# Patient Record
Sex: MALE | Race: WHITE | NOT HISPANIC OR LATINO | Employment: OTHER | URBAN - METROPOLITAN AREA
[De-identification: names, ages, dates, MRNs, and addresses within clinical notes are randomized per-mention and may not be internally consistent; named-entity substitution may affect disease eponyms.]

---

## 2017-09-14 ENCOUNTER — COMMUNICATION - HEALTHEAST (OUTPATIENT)
Dept: CARDIOLOGY | Facility: CLINIC | Age: 77
End: 2017-09-14

## 2017-09-14 DIAGNOSIS — I10 HTN (HYPERTENSION): ICD-10-CM

## 2017-11-08 ENCOUNTER — AMBULATORY - HEALTHEAST (OUTPATIENT)
Dept: CARDIOLOGY | Facility: CLINIC | Age: 77
End: 2017-11-08

## 2017-11-08 ENCOUNTER — COMMUNICATION - HEALTHEAST (OUTPATIENT)
Dept: CARDIOLOGY | Facility: CLINIC | Age: 77
End: 2017-11-08

## 2017-11-08 DIAGNOSIS — I25.810 CORONARY ARTERY DISEASE INVOLVING CORONARY BYPASS GRAFT: ICD-10-CM

## 2017-11-08 DIAGNOSIS — I25.10 CAD (CORONARY ARTERY DISEASE): ICD-10-CM

## 2017-12-10 ENCOUNTER — COMMUNICATION - HEALTHEAST (OUTPATIENT)
Dept: CARDIOLOGY | Facility: CLINIC | Age: 77
End: 2017-12-10

## 2017-12-10 DIAGNOSIS — I10 HTN (HYPERTENSION): ICD-10-CM

## 2018-01-08 ENCOUNTER — HOSPITAL ENCOUNTER (OUTPATIENT)
Dept: NUCLEAR MEDICINE | Facility: HOSPITAL | Age: 78
Discharge: HOME | End: 2018-01-08
Attending: INTERNAL MEDICINE

## 2018-01-08 ENCOUNTER — HOSPITAL ENCOUNTER (OUTPATIENT)
Dept: CARDIOLOGY | Facility: HOSPITAL | Age: 78
Discharge: HOME | End: 2018-01-08
Attending: INTERNAL MEDICINE

## 2018-01-08 DIAGNOSIS — I25.810 CORONARY ARTERY DISEASE INVOLVING CORONARY BYPASS GRAFT: ICD-10-CM

## 2018-01-08 DIAGNOSIS — I25.10 CAD (CORONARY ARTERY DISEASE): ICD-10-CM

## 2018-01-08 LAB
CV STRESS CURRENT BP HE: NORMAL
CV STRESS CURRENT HR HE: 77
CV STRESS CURRENT HR HE: 78
CV STRESS CURRENT HR HE: 79
CV STRESS CURRENT HR HE: 81
CV STRESS CURRENT HR HE: 82
CV STRESS CURRENT HR HE: 83
CV STRESS CURRENT HR HE: 83
CV STRESS CURRENT HR HE: 84
CV STRESS CURRENT HR HE: 85
CV STRESS CURRENT HR HE: 88
CV STRESS CURRENT HR HE: 88
CV STRESS CURRENT HR HE: 90
CV STRESS DEVIATION TIME HE: NORMAL
CV STRESS ECHO PERCENT HR HE: NORMAL
CV STRESS EXERCISE STAGE HE: NORMAL
CV STRESS FINAL RESTING BP HE: NORMAL
CV STRESS FINAL RESTING HR HE: 83
CV STRESS MAX HR HE: 91
CV STRESS MAX TREADMILL GRADE HE: 0
CV STRESS MAX TREADMILL SPEED HE: 0
CV STRESS PEAK DIA BP HE: NORMAL
CV STRESS PEAK SYS BP HE: NORMAL
CV STRESS PHASE HE: NORMAL
CV STRESS PROTOCOL HE: NORMAL
CV STRESS RESTING PT POSITION HE: NORMAL
CV STRESS ST DEVIATION AMOUNT HE: NORMAL
CV STRESS ST DEVIATION ELEVATION HE: NORMAL
CV STRESS ST EVELATION AMOUNT HE: NORMAL
CV STRESS TEST TYPE HE: NORMAL
CV STRESS TOTAL STAGE TIME MIN 1 HE: NORMAL
NUC STRESS EJECTION FRACTION: 75 %
STRESS ECHO BASELINE BP: NORMAL
STRESS ECHO BASELINE HR: 74
STRESS ECHO CALCULATED PERCENT HR: 65 %
STRESS ECHO LAST STRESS BP: NORMAL
STRESS ECHO LAST STRESS HR: 88

## 2018-01-08 RX ORDER — TAMSULOSIN HYDROCHLORIDE 0.4 MG/1
0.4 CAPSULE ORAL
Status: SHIPPED | COMMUNITY
Start: 2018-01-08 | End: 2023-10-11

## 2018-01-08 RX ORDER — FINASTERIDE 5 MG/1
5 TABLET, FILM COATED ORAL DAILY
Status: SHIPPED | COMMUNITY
Start: 2018-01-08

## 2018-01-17 ENCOUNTER — OFFICE VISIT - HEALTHEAST (OUTPATIENT)
Dept: CARDIOLOGY | Facility: CLINIC | Age: 78
End: 2018-01-17

## 2018-01-17 DIAGNOSIS — I25.83 CORONARY ATHEROSCLEROSIS DUE TO LIPID RICH PLAQUE: ICD-10-CM

## 2018-01-17 DIAGNOSIS — I10 ESSENTIAL HYPERTENSION: ICD-10-CM

## 2018-01-17 DIAGNOSIS — E78.00 HYPERCHOLESTEREMIA: ICD-10-CM

## 2018-01-17 DIAGNOSIS — Z95.1 S/P CABG X 3: ICD-10-CM

## 2018-01-17 DIAGNOSIS — K21.9 GASTROESOPHAGEAL REFLUX DISEASE WITHOUT ESOPHAGITIS: ICD-10-CM

## 2018-01-17 ASSESSMENT — MIFFLIN-ST. JEOR: SCORE: 1615.72

## 2018-03-07 ENCOUNTER — COMMUNICATION - HEALTHEAST (OUTPATIENT)
Dept: CARDIOLOGY | Facility: CLINIC | Age: 78
End: 2018-03-07

## 2018-03-07 DIAGNOSIS — I10 HTN (HYPERTENSION): ICD-10-CM

## 2018-09-25 ENCOUNTER — COMMUNICATION - HEALTHEAST (OUTPATIENT)
Dept: CARDIOLOGY | Facility: CLINIC | Age: 78
End: 2018-09-25

## 2018-09-25 DIAGNOSIS — I10 HTN (HYPERTENSION): ICD-10-CM

## 2018-11-26 ENCOUNTER — HOSPITAL ENCOUNTER (OUTPATIENT)
Dept: CARDIOLOGY | Facility: HOSPITAL | Age: 78
Discharge: HOME | End: 2018-11-26
Attending: INTERNAL MEDICINE

## 2018-11-26 ENCOUNTER — HOSPITAL ENCOUNTER (OUTPATIENT)
Dept: NUCLEAR MEDICINE | Facility: HOSPITAL | Age: 78
Discharge: HOME | End: 2018-11-26
Attending: INTERNAL MEDICINE

## 2018-11-26 DIAGNOSIS — I25.83 CORONARY ATHEROSCLEROSIS DUE TO LIPID RICH PLAQUE: ICD-10-CM

## 2018-11-26 DIAGNOSIS — Z95.1 S/P CABG X 3: ICD-10-CM

## 2018-11-26 LAB
CV STRESS MAX HR HE: 102
NUC STRESS EJECTION FRACTION: 75 %
STRESS ECHO BASELINE BP: NORMAL MM OF HG
STRESS ECHO BASELINE HR: 75 BPM
STRESS ECHO CALCULATED PERCENT HR: 73 %
STRESS ECHO LAST STRESS BP: NORMAL MM OF HG

## 2018-11-26 ASSESSMENT — MIFFLIN-ST. JEOR: SCORE: 1559.01

## 2018-12-03 ENCOUNTER — OFFICE VISIT - HEALTHEAST (OUTPATIENT)
Dept: CARDIOLOGY | Facility: CLINIC | Age: 78
End: 2018-12-03

## 2018-12-03 DIAGNOSIS — Z95.1 S/P CABG X 3: ICD-10-CM

## 2018-12-03 DIAGNOSIS — R00.2 PALPITATIONS: ICD-10-CM

## 2018-12-03 DIAGNOSIS — E78.00 HYPERCHOLESTEREMIA: ICD-10-CM

## 2018-12-03 DIAGNOSIS — I10 ESSENTIAL HYPERTENSION: ICD-10-CM

## 2018-12-03 DIAGNOSIS — I25.83 CORONARY ATHEROSCLEROSIS DUE TO LIPID RICH PLAQUE: ICD-10-CM

## 2018-12-03 ASSESSMENT — MIFFLIN-ST. JEOR: SCORE: 1590.76

## 2018-12-07 ENCOUNTER — HOSPITAL ENCOUNTER (OUTPATIENT)
Dept: CARDIOLOGY | Facility: HOSPITAL | Age: 78
Discharge: HOME | End: 2018-12-07
Attending: INTERNAL MEDICINE

## 2018-12-07 DIAGNOSIS — R00.2 PALPITATIONS: ICD-10-CM

## 2018-12-07 DIAGNOSIS — I25.83 CORONARY ATHEROSCLEROSIS DUE TO LIPID RICH PLAQUE: ICD-10-CM

## 2018-12-18 ENCOUNTER — COMMUNICATION - HEALTHEAST (OUTPATIENT)
Dept: CARDIOLOGY | Facility: CLINIC | Age: 78
End: 2018-12-18

## 2018-12-18 DIAGNOSIS — I10 HTN (HYPERTENSION): ICD-10-CM

## 2018-12-18 RX ORDER — VALSARTAN 320 MG/1
320 TABLET ORAL DAILY
Qty: 90 TABLET | Refills: 3 | Status: SHIPPED | OUTPATIENT
Start: 2018-12-18 | End: 2023-10-11

## 2018-12-21 ENCOUNTER — COMMUNICATION - HEALTHEAST (OUTPATIENT)
Dept: CARDIOLOGY | Facility: CLINIC | Age: 78
End: 2018-12-21

## 2019-04-08 ENCOUNTER — COMMUNICATION - HEALTHEAST (OUTPATIENT)
Dept: CARDIOLOGY | Facility: CLINIC | Age: 79
End: 2019-04-08

## 2019-04-08 DIAGNOSIS — K21.9 GASTROESOPHAGEAL REFLUX DISEASE WITHOUT ESOPHAGITIS: ICD-10-CM

## 2020-09-22 ENCOUNTER — COMMUNICATION - HEALTHEAST (OUTPATIENT)
Dept: CARDIOLOGY | Facility: CLINIC | Age: 80
End: 2020-09-22

## 2020-09-23 ENCOUNTER — OFFICE VISIT - HEALTHEAST (OUTPATIENT)
Dept: CARDIOLOGY | Facility: CLINIC | Age: 80
End: 2020-09-23

## 2020-09-23 DIAGNOSIS — R35.0 BENIGN PROSTATIC HYPERPLASIA WITH URINARY FREQUENCY: ICD-10-CM

## 2020-09-23 DIAGNOSIS — I10 ESSENTIAL HYPERTENSION: ICD-10-CM

## 2020-09-23 DIAGNOSIS — E78.00 HYPERCHOLESTEREMIA: ICD-10-CM

## 2020-09-23 DIAGNOSIS — I25.83 CORONARY ATHEROSCLEROSIS DUE TO LIPID RICH PLAQUE: ICD-10-CM

## 2020-09-23 DIAGNOSIS — N40.1 BENIGN PROSTATIC HYPERPLASIA WITH URINARY FREQUENCY: ICD-10-CM

## 2020-09-23 DIAGNOSIS — Z95.1 S/P CABG X 3: ICD-10-CM

## 2020-09-23 RX ORDER — ALBUTEROL SULFATE 90 UG/1
1-2 AEROSOL, METERED RESPIRATORY (INHALATION)
Status: SHIPPED | COMMUNITY
Start: 2020-07-23 | End: 2023-10-11

## 2020-09-23 RX ORDER — DOXAZOSIN 4 MG/1
4 TABLET ORAL DAILY
Status: SHIPPED | COMMUNITY
Start: 2020-09-16 | End: 2023-10-11 | Stop reason: DRUGHIGH

## 2020-09-23 ASSESSMENT — MIFFLIN-ST. JEOR: SCORE: 1589.63

## 2020-11-06 ENCOUNTER — COMMUNICATION - HEALTHEAST (OUTPATIENT)
Dept: CARDIOLOGY | Facility: CLINIC | Age: 80
End: 2020-11-06

## 2020-11-06 DIAGNOSIS — E78.00 HYPERCHOLESTEREMIA: ICD-10-CM

## 2021-05-29 ENCOUNTER — RECORDS - HEALTHEAST (OUTPATIENT)
Dept: ADMINISTRATIVE | Facility: CLINIC | Age: 81
End: 2021-05-29

## 2021-05-31 VITALS — HEIGHT: 71 IN | BODY MASS INDEX: 27.58 KG/M2 | WEIGHT: 197 LBS

## 2021-06-02 VITALS — BODY MASS INDEX: 26.92 KG/M2 | HEIGHT: 70 IN | WEIGHT: 188 LBS

## 2021-06-02 VITALS — WEIGHT: 195 LBS | BODY MASS INDEX: 27.92 KG/M2 | HEIGHT: 70 IN

## 2021-06-05 VITALS
HEIGHT: 71 IN | RESPIRATION RATE: 16 BRPM | HEART RATE: 76 BPM | WEIGHT: 193 LBS | DIASTOLIC BLOOD PRESSURE: 60 MMHG | BODY MASS INDEX: 27.02 KG/M2 | SYSTOLIC BLOOD PRESSURE: 144 MMHG

## 2021-06-09 ENCOUNTER — RECORDS - HEALTHEAST (OUTPATIENT)
Dept: ADMINISTRATIVE | Facility: CLINIC | Age: 81
End: 2021-06-09

## 2021-06-11 NOTE — TELEPHONE ENCOUNTER
Wellness Screening Tool  Symptom Screening:  Do you have one of the following NEW symptoms:    Fever (subjective or >100.0)?  No    A new cough?  No    Shortness of breath?  No     Chills? No     New loss of taste or smell? No     Generalized body aches? No     New persistent headache? No     New sore throat? No     Nausea, vomiting, or diarrhea?  No    Within the past 2 weeks, have you been exposed to someone with a known positive illness below:    COVID-19 (known or suspected)?  No    Chicken pox?  No    Mealses?  No    Pertussis?  No    Patient notified of visitor policy- They may have one person accompany them to their appointment, but they will need to wear a mask and will be screened upon arrival for symptoms: Yes  Pt informed to wear a mask: Yes  Pt notified if they develop any symptoms listed above, prior to their appointment, they are to call the clinic directly at [phone] for further instructions.  Yes  Patient's appointment status: Patient will be seen in clinic as scheduled on 9/23

## 2021-06-11 NOTE — PATIENT INSTRUCTIONS - HE
Mr John Garnica,  I enjoyed visiting with you again today.  I am glad to hear you are doing well.  Per our conversation double the TRICOR to 2 a day and after about 2 months of this we should recheck a lipid level and call if you run out [phone].  I have no issues with the upcoming urological procedure.  I will plan on seeing you 1 year or sooner if needed.  Lastly, compare this list with your meds at home and if not accurate call me.  Pb Lu

## 2021-06-12 NOTE — TELEPHONE ENCOUNTER
"----- Message from Savanah Lu MD sent at 9/23/2020 10:29 AM CDT -----  Man who is coming back to me after being at [facility] for 2 years.Increasing his TriCor, also monitor his blood pressure with some stone extraction coming up.Can we connect with him in about a month or so, make sure blood pressures are doing better following stone extraction, also want to make sure he did double his TriCor.  2 months after TriCor doubled I think we need to recheck fasting lipids including triglycerides and ALT AST and total bili please.LF          Noted. Orders placed for lab work. Called patient to get a status update. He states that his blood pressure remains elevated- he is still dealing with some lingering bladder and kidney stones. When asked what his medications were and how his BP has been- he cut writer off and requesting that we review his [facility] records \"as they have all that information\". Will review- he does not believe he is doubling his Tricor but will check his bottles.   "

## 2021-06-15 NOTE — PROGRESS NOTES
[facility] Follow-up Note    Assessment & Plan        1. Coronary atherosclerosis due to lipid rich plaque -this is of 3 vessels. Angiography in 2005 showed a normal left main, totally occluded proximal left anterior descending, circumflex unremarkable with an obtuse marginal artery one with a 10% stenosis. The right coronary artery had a 10-20% proximal stenosis, a 10% mid stenosis, and a distal 95% stenosis which received a drug-coated stent at that time.     2. S/P CABG x 3 -in 1995 there was a LIMA to the LAD which is patent, a vein graft to the first diagonal which is patent, and a vein graft to a second diagonal which is totally occluded. He has no symptoms but given that the surgery was over 20 years ago I will arrange for stress testing yearly, and stress test just done several weeks ago was normal.    3. Hypertension -under good control on several agents.   4. Hypercholesteremia -excellent cholesterol at 136 however triglycerides elevated over 400 and will check for qualification to a study.   5. Gastroesophageal reflux disease without esophagitis -renewed his Prilosec for him.     Plan  1.  Renew Prilosec.  2.  Stress test in a year.  3.  Follow-up in the year or sooner if needed.  4.  Consider lipid research study.    Subjective  CC: 77-year-old white gentleman here for yearly follow-up today.  We discussed that for his 78th birthday he will be taking an [employer] [place] cruise with the family.  He is bowling avidly and doing well without any fatigue, syncope, dizziness, chest discomfort, palpitations, PND, orthopnea or peripheral edema.    Medications  Current Outpatient Prescriptions   Medication Sig Note     alendronate (FOSAMAX) 70 MG tablet Take 70 mg by mouth every 7 days. Take in the morning on an empty stomach with a full glass of water 30 minutes before food      aspirin 81 MG EC tablet Take 81 mg by mouth daily.      BRIMONIDINE TARTRATE (BRIMONIDINE OPHT) Apply 1 drop to eye 3 (three)  "times a day. Right eye      CA/D3/MAG OX/ZINC//DANTE/BOR (CALCIUM 600+D3 PLUS ORAL) Take 2-3 tablets by mouth daily.       CHOLECALCIFEROL, VITAMIN D3, (VITAMIN D3 ORAL) Take 600 Units by mouth daily.      fenofibrate (TRICOR) 54 MG tablet Take 54 mg by mouth daily.      finasteride (PROSCAR) 5 mg tablet Take 5 mg by mouth daily.      hydroCHLOROthiazide (MICROZIDE) 12.5 mg capsule Take 12.5 mg by mouth daily. 10/18/2016: Received from: External Pharmacy Received Sig: TAKE ONE CAPSULE BY MOUTH ONE TIME DAILY     latanoprost (XALATAN) 0.005 % ophthalmic solution Administer 1 drop to the right eye bedtime.      MULTIVITAMIN (MULTIPLE VITAMIN ORAL) Take 1 tablet by mouth daily.      omeprazole (PRILOSEC) 20 MG capsule Take 1 capsule (20 mg total) by mouth daily.      ranitidine (ZANTAC) 150 MG tablet Take 150 mg by mouth 2 (two) times a day.      simvastatin (ZOCOR) 20 MG tablet Take 10 mg by mouth at bedtime.  10/18/2016: Received from: External Pharmacy Received Sig:      tamsulosin (FLOMAX) 0.4 mg Cp24 Take 0.4 mg by mouth.      timolol maleate (TIMOPTIC) 0.5 % ophthalmic solution Administer 1 drop to the right eye 2 (two) times a day.      valsartan (DIOVAN) 320 MG tablet TAKE 1 TABLET BY MOUTH DAILY.        Objective  /68 (Patient Site: Right Arm, Patient Position: Sitting, Cuff Size: Adult Large)  Pulse 76  Resp 16  Ht 5' 11\" (1.803 m)  Wt 197 lb (89.4 kg)  BMI 27.48 kg/m2    General Appearance:    Alert, cooperative, no distress, appears stated age   Head:    Normocephalic, without obvious abnormality, atraumatic   Throat:   Lips, mucosa, and tongue normal; teeth and gums normal   Neck:   Supple, symmetrical, trachea midline, no adenopathy;        thyroid:  No enlargement/tenderness/nodules; no carotid    bruit or JVD   Back:     Symmetric, no curvature, ROM normal, no CVA tenderness   Lungs:     Clear to auscultation bilaterally, respirations unlabored   Chest wall:    No tenderness, midline " sternotomy scar   Heart:    Regular rate and rhythm, S1 and S2 normal, no murmur, rub   or gallop   Abdomen:     Soft, non-tender, bowel sounds active all four quadrants,     no masses, no organomegaly   Extremities:   Normal, atraumatic, no cyanosis or edema   Pulses:   2+ and symmetric all extremities   Skin:   Skin color, texture, turgor normal, no rashes or lesions     Results    Lab Results personally reviewed   Lab Results   Component Value Date    CHOL 109 10/18/2016    CHOL  CHOL 126  136 05/15/2012  01/02/2018     Lab Results   Component Value Date    HDL 32 (L) 10/18/2016    HDL 36 (L) 01/02/2018     Lab Results   Component Value Date    LDLCALC 19 10/18/2016    LDLCALC  LDL CALC 71  42 05/15/2012  01/02/2018     Lab Results   Component Value Date    TRIG 288 (H) 10/18/2016    TRIG  TRIG 93  470 05/15/2012  01/02/2018     No results found for: WBC, HGB, HCT, PLT  Lab Results   Component Value Date    CREATININE 1.1 07/14/2010    BUN 17 07/14/2010     07/14/2010    K 3.8 07/14/2010    CO2 29 07/14/2010     Review of Systems:   General: Weight Loss  Eyes: WNL  Ears/Nose/Throat: WNL  Lungs: WNL  Heart: WNL  Stomach: Constipation  Bladder: Frequent Urination at Night  Muscle/Joints: WNL  Skin: Poor Wound Healing, Rash  Nervous System: WNL  Mental Health: WNL     Blood: WNL

## 2021-06-16 PROBLEM — R00.2 PALPITATIONS: Status: ACTIVE | Noted: 2018-12-03

## 2021-06-16 PROBLEM — N40.1 BENIGN PROSTATIC HYPERPLASIA WITH LOWER URINARY TRACT SYMPTOMS: Status: ACTIVE | Noted: 2020-09-23

## 2021-06-22 NOTE — PROGRESS NOTES
[facility] Follow-up Note    Assessment & Plan        1. Coronary atherosclerosis due to lipid rich plaque -this is of 3 vessels. Angiography in 2005 showed a normal left main, totally occluded proximal left anterior descending, circumflex unremarkable with an obtuse marginal artery one with a 10% stenosis. The right coronary artery had a 10-20% proximal stenosis, a 10% mid stenosis, and a distal 95% stenosis which received a drug-coated stent at that time.  Symptomatically getting along well without any complaints currently and recent stress test looks normal.      2. S/P CABG x 3 -in 1995 there was a LIMA to the LAD which is patent, a vein graft to the first diagonal which is patent, and a vein graft to a second diagonal which is totally occluded. He has no symptoms but given that the surgery was over 20 years ago I performed nuclear  stress testing yearly, and stress test just done several weeks ago was normal.    3. Hypertension -under good control currently.   4. Hypercholesteremia -cholesterol 136 with an HDL 36 and LDL of 52 although triglycerides markedly elevated at 470.  On simvastatin and TriCor with good-looking liver function.   5. Palpitations -he has these almost nightly and given this I would like to make sure there are no atrial arrhythmias and will have him wear a one-week ACT monitor.     Plan  1.  1 week ACT monitor and address of arrhythmias seen.  2.  Follow-up with me in 1 year or sooner if needed, although he tells me he has to go to [facility] and may be not able to see me anymore.    Subjective  CC: 78-year-old white gentleman here for yearly follow-up today.  He still active bowling 3 or 4 times a week.  Tells me occasionally at nighttime or when he is quiet he feels his heart skipping beats, maybe 3-4 times a week 5 minutes each time.  Otherwise there is no syncope, dizziness, chest discomfort, PND, orthopnea or peripheral edema.    Medications  Current Outpatient  "Medications   Medication Sig Note     alendronate (FOSAMAX) 70 MG tablet Take 70 mg by mouth every 7 days. Take in the morning on an empty stomach with a full glass of water 30 minutes before food      aspirin 81 MG EC tablet Take 81 mg by mouth daily.      BRIMONIDINE TARTRATE (BRIMONIDINE OPHT) Apply 1 drop to eye 3 (three) times a day. Right eye      CA/D3/MAG OX/ZINC//DANTE/BOR (CALCIUM 600+D3 PLUS ORAL) Take 2-3 tablets by mouth daily.       CHOLECALCIFEROL, VITAMIN D3, (VITAMIN D3 ORAL) Take 600 Units by mouth daily.      fenofibrate (TRICOR) 54 MG tablet Take 54 mg by mouth daily.      finasteride (PROSCAR) 5 mg tablet Take 5 mg by mouth daily.      hydroCHLOROthiazide (MICROZIDE) 12.5 mg capsule Take 12.5 mg by mouth daily. 10/18/2016: Received from: External Pharmacy Received Sig: TAKE ONE CAPSULE BY MOUTH ONE TIME DAILY     MULTIVITAMIN (MULTIPLE VITAMIN ORAL) Take 1 tablet by mouth daily.      omeprazole (PRILOSEC) 20 MG capsule Take 1 capsule (20 mg total) by mouth daily.      ranitidine (ZANTAC) 150 MG tablet Take 150 mg by mouth 2 (two) times a day.      simvastatin (ZOCOR) 20 MG tablet Take 10 mg by mouth at bedtime.  10/18/2016: Received from: External Pharmacy Received Sig:      tamsulosin (FLOMAX) 0.4 mg Cp24 Take 0.4 mg by mouth.      timolol maleate (TIMOPTIC) 0.5 % ophthalmic solution Administer 1 drop to the right eye 2 (two) times a day.      valsartan (DIOVAN) 320 MG tablet TAKE 1 TABLET BY MOUTH DAILY.      latanoprost (XALATAN) 0.005 % ophthalmic solution Administer 1 drop to the right eye bedtime.        Objective  /60   Pulse 76   Resp 20   Ht 5' 10\" (1.778 m)   Wt 195 lb (88.5 kg)   BMI 27.98 kg/m      General Appearance:    Alert, cooperative, no distress, appears stated age   Head:    Normocephalic, without obvious abnormality, atraumatic   Throat:   Lips, mucosa, and tongue normal; teeth and gums normal   Neck:   Supple, symmetrical, trachea midline, no adenopathy;        " thyroid:  No enlargement/tenderness/nodules; no carotid    bruit or JVD   Back:     Symmetric, no curvature, ROM normal, no CVA tenderness   Lungs:     Clear to auscultation bilaterally, respirations unlabored   Chest wall:    No tenderness, midline sternotomy scar   Heart:    Regular rate and rhythm, S1 and S2 normal, no murmur, rub   or gallop   Abdomen:     Soft, non-tender, bowel sounds active all four quadrants,     no masses, no organomegaly   Extremities:   Normal, atraumatic, no cyanosis or edema   Pulses:   2+ and symmetric all extremities   Skin:   Skin color, texture, turgor normal, no rashes or lesions     Results    Lab Results personally reviewed   Lab Results   Component Value Date    CHOL 109 10/18/2016    CHOL 126 05/15/2012     Lab Results   Component Value Date    HDL 32 (L) 10/18/2016    HDL 36 (L) 05/15/2012     Lab Results   Component Value Date    LDLCALC 19 10/18/2016    LDLCALC 71 05/15/2012     Lab Results   Component Value Date    TRIG 288 (H) 10/18/2016    TRIG 93 05/15/2012     No results found for: WBC, HGB, HCT, PLT  Lab Results   Component Value Date    CREATININE 1.1 07/14/2010    BUN 17 07/14/2010     07/14/2010    K 3.8 07/14/2010    CO2 29 07/14/2010     Review of Systems:   General: WNL  Eyes: WNL  Ears/Nose/Throat: WNL  Lungs: WNL  Heart: WNL  Stomach: WNL  Bladder: WNL  Muscle/Joints: WNL  Skin: Poor Wound Healing  Nervous System: WNL  Mental Health: WNL     Blood: WNL

## 2021-06-29 NOTE — PROGRESS NOTES
Progress Notes by Savanah Lu MD at 9/23/2020 10:10 AM     Author: Savanah Lu MD Service: -- Author Type: Physician    Filed: 9/23/2020 10:27 AM Encounter Date: 9/23/2020 Status: Signed    : Savanah Lu MD (Physician)           [facility]  [facility] Clinic Follow-up Note    Assessment & Plan        1. Coronary atherosclerosis due to lipid rich plaque -this is of 3 vessels. Angiography in 2005 showed a normal left main, totally occluded proximal left anterior descending, circumflex unremarkable with an obtuse marginal artery one with a 10% stenosis. The right coronary artery had a 10-20% proximal stenosis, a 10% mid stenosis, and a distal 95% stenosis which received a drug-coated stent at that time.  Symptomatically getting along well without any complaints currently and recent stress test looks normal from September 2020.      2. S/P CABG x 3 -in 1995 there was a LIMA to the LAD which is patent, a vein graft to the first diagonal which is patent, and a vein graft to a second diagonal which is totally occluded. He has no symptoms but given that the surgery was over 20 years ago  stress testing yearly, and stress test just done several weeks ago was normal.    3. Hypertension -borderline elevated and we sent on Diovan 320 mg a day as well as Cardura, tamsulosin, and Proscar.  Once urological procedure done reevaluate blood pressure and potentially go up on dose.   4. Hypercholesteremia -cholesterol excellent at 114 with an LDL of 40 however triglycerides 486.  Have him double his TriCor, get him prescription for 120 mg fenofibrate tablets, recheck lipids and LFTs to make sure all stable.   5. Benign prostatic hyperplasia with urinary frequency -history of urological stones as well, getting ready to have procedure at [facility].  No cardiac contraindications to this other than stopping aspirin for a week.     Plan  1.  Speak with urologist but contemplate stopping aspirin for a  "week before stone extraction.  2.  Double TriCor 220 mg, recheck lipids and LFTs in about 3 months.  3.  Follow-up with me 1 year or sooner if needed.  4.  Monitor blood pressure, once stones extracted if remains high, will need to add additional agent.    Subjective  CC: 80-year-old white gentleman being seen in follow-up, since I seen him he went to see cardiologist over at [facility], that cardiologist is retiring and is now coming back to see me.  He does complain of shortness of breath and heavy activity.  Pulmonary performed PFTs on him and he was diagnosed with mild asthma.  Other than this, there is no fatigue, syncope, dizziness, PND, orthopnea, chest discomfort or palpitations or peripheral edema.    Medications  Current Outpatient Medications   Medication Sig   ? albuterol (PROAIR HFA;PROVENTIL HFA;VENTOLIN HFA) 90 mcg/actuation inhaler Inhale 1-2 puffs.   ? aspirin 81 MG EC tablet Take 81 mg by mouth daily.   ? bimatoprost (LUMIGAN) 0.01 % Drop Administer 1 drop to both eyes every evening.   ? CA/D3/MAG OX/ZINC//DANTE/BOR (CALCIUM 600+D3 PLUS ORAL) Take 2-3 tablets by mouth daily.    ? doxazosin (CARDURA) 4 MG tablet Take 4 mg by mouth daily.   ? fenofibrate (TRICOR) 54 MG tablet Take 54 mg by mouth daily.   ? finasteride (PROSCAR) 5 mg tablet Take 5 mg by mouth daily.   ? MULTIVITAMIN (MULTIPLE VITAMIN ORAL) Take 1 tablet by mouth daily.   ? omeprazole (PRILOSEC) 20 MG capsule Take 1 capsule (20 mg total) by mouth daily.   ? simvastatin (ZOCOR) 20 MG tablet Take 10 mg by mouth at bedtime.    ? tamsulosin (FLOMAX) 0.4 mg Cp24 Take 0.4 mg by mouth.   ? valsartan (DIOVAN) 320 MG tablet Take 1 tablet (320 mg total) by mouth daily.   ? CHOLECALCIFEROL, VITAMIN D3, (VITAMIN D3 ORAL) Take 600 Units by mouth daily.       Objective  /60 (Patient Site: Left Arm, Patient Position: Sitting, Cuff Size: Adult Regular)   Pulse 76   Resp 16   Ht 5' 10.5\" (1.791 m)   Wt 193 lb (87.5 kg)   BMI 27.30 " kg/m      General Appearance:    Alert, cooperative, no distress, appears stated age   Head:    Normocephalic, without obvious abnormality, atraumatic   Throat:   Lips, mucosa, and tongue normal; teeth and gums normal   Neck:   Supple, symmetrical, trachea midline, no adenopathy;        thyroid:  No enlargement/tenderness/nodules; no carotid    bruit or JVD   Back:     Symmetric, no curvature, ROM normal, no CVA tenderness   Lungs:     Clear to auscultation bilaterally, respirations unlabored   Chest wall:    No tenderness, midline sternotomy scar   Heart:    Regular rate and rhythm, S1 and S2 normal, no murmur, rub   or gallop   Abdomen:     Soft, non-tender, bowel sounds active all four quadrants,     no masses, no organomegaly   Extremities:   Normal, atraumatic, no cyanosis or edema   Pulses:   2+ and symmetric all extremities   Skin:   Skin color, texture, turgor normal, no rashes or lesions     Results    Lab Results personally reviewed   Lab Results   Component Value Date    CHOL 109 10/18/2016    CHOL 126 05/15/2012     Lab Results   Component Value Date    HDL 32 (L) 10/18/2016    HDL 36 (L) 05/15/2012     Lab Results   Component Value Date    LDLCALC 19 10/18/2016    LDLCALC 71 05/15/2012     Lab Results   Component Value Date    TRIG 288 (H) 10/18/2016    TRIG 93 05/15/2012     No results found for: WBC, HGB, HCT, PLT  Lab Results   Component Value Date    CREATININE 1.1 07/14/2010    BUN 17 07/14/2010     07/14/2010    K 3.8 07/14/2010    CO2 29 07/14/2010     Review of Systems:   General: WNL  Eyes: WNL  Ears/Nose/Throat: WNL  Lungs: WNL  Heart: Shortness of Breath with activity  Stomach: WNL  Bladder: Frequent Urination at Night  Muscle/Joints: WNL  Skin: Poor Wound Healing  Nervous System: WNL  Mental Health: WNL     Blood: WNL

## 2022-08-16 ENCOUNTER — TRANSFERRED RECORDS (OUTPATIENT)
Dept: HEALTH INFORMATION MANAGEMENT | Facility: CLINIC | Age: 82
End: 2022-08-16

## 2023-10-11 ENCOUNTER — OFFICE VISIT (OUTPATIENT)
Dept: CARDIOLOGY | Facility: CLINIC | Age: 83
End: 2023-10-11
Payer: COMMERCIAL

## 2023-10-11 VITALS
SYSTOLIC BLOOD PRESSURE: 136 MMHG | WEIGHT: 189 LBS | DIASTOLIC BLOOD PRESSURE: 52 MMHG | HEART RATE: 75 BPM | BODY MASS INDEX: 26.74 KG/M2 | RESPIRATION RATE: 18 BRPM | OXYGEN SATURATION: 97 %

## 2023-10-11 DIAGNOSIS — E78.00 HYPERCHOLESTEREMIA: ICD-10-CM

## 2023-10-11 DIAGNOSIS — Z95.1 S/P CABG X 3: ICD-10-CM

## 2023-10-11 DIAGNOSIS — I25.83 CORONARY ATHEROSCLEROSIS DUE TO LIPID RICH PLAQUE: Primary | ICD-10-CM

## 2023-10-11 DIAGNOSIS — I10 ESSENTIAL HYPERTENSION: ICD-10-CM

## 2023-10-11 DIAGNOSIS — N40.1 BENIGN PROSTATIC HYPERPLASIA WITH LOWER URINARY TRACT SYMPTOMS, SYMPTOM DETAILS UNSPECIFIED: ICD-10-CM

## 2023-10-11 PROCEDURE — 99204 OFFICE O/P NEW MOD 45 MIN: CPT | Performed by: INTERNAL MEDICINE

## 2023-10-11 RX ORDER — BRIMONIDINE TARTRATE AND TIMOLOL MALEATE 2; 5 MG/ML; MG/ML
1 SOLUTION OPHTHALMIC 2 TIMES DAILY
COMMUNITY
Start: 2023-06-14

## 2023-10-11 RX ORDER — DOXAZOSIN 2 MG/1
1 TABLET ORAL AT BEDTIME
COMMUNITY
Start: 2023-01-09

## 2023-10-11 RX ORDER — IRBESARTAN 300 MG/1
1 TABLET ORAL DAILY
COMMUNITY
Start: 2023-08-29

## 2023-10-11 RX ORDER — POTASSIUM CHLORIDE 1500 MG/1
1 TABLET, EXTENDED RELEASE ORAL DAILY
COMMUNITY
Start: 2023-09-18

## 2023-10-11 RX ORDER — LABETALOL 200 MG/1
200 TABLET, FILM COATED ORAL 2 TIMES DAILY
COMMUNITY
Start: 2023-09-13 | End: 2024-06-03

## 2023-10-11 NOTE — PATIENT INSTRUCTIONS
Mr John Gant,  I enjoyed visiting with you again today.  I am a little concerned with the shortness of breath.  Per our conversation we will get the stress test.  I will plan on seeing you 3-4 months.  Pb Lu

## 2023-10-11 NOTE — LETTER
10/11/2023    Zhang Busch,   [address]  [place] [address]    RE: John Gant       Dear Colleague,     I had the pleasure of seeing John Gant in the [facility].      [facility]  Heart Care Clinic Follow-up Note    Assessment & Plan        (I25.10,  I25.83) Coronary atherosclerosis due to lipid rich plaque  (primary encounter diagnosis)  Comment: Angiography in 2005 showed a normal left main, totally occluded proximal left anterior descending, circumflex unremarkable with an obtuse marginal artery one with a 10% stenosis. The right coronary artery had a 10-20% proximal stenosis, a 10% mid stenosis, and a distal 95% stenosis which received a drug-coated stent at that time.  Symptomatically having shortness of breath, had normal stress test in September 2020, given that bypass was so long ago we will repeat stress pharmacological nuclear as he is unable to exercise on treadmill.    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 He has increased shortness of breath and will perform pharmacological stress nuclear as above.    (E78.00) Hypercholesteremia  Comment: Total cholesterol is 112, continue current low-dose statin.    (I10) Hypertension  Comment: Resistant hypertension, on irbesartan, labetalol, as well as 2 alpha blockers.  Good control currently.    (N40.1) Benign prostatic hyperplasia with lower urinary tract symptoms, symptom details unspecified  Comment: Currently on Cardura as well as Proscar, no significant urinary symptoms.    Plan  1.  Pharmacological stress nuclear, if abnormal pursue angiography.  2.  Follow-up with me in several months to go over the results of these.  3.  Stress test is normal we will check echo.    Subjective  CC: 83-year-old white gentleman here for visit after having not seen me  in over 3 years.  He tells me he is noted over the last several months increased shortness of breath and activity such as walking to the mailbox or picking weeds.  There is no PND, orthopnea, cough, sputum production, chest pains, or palpitations.  He does tell me he is drinking about 3 brandies a day, lives at home independently with his wife in their house, he seems a little bit more hard of hearing, and since I seen him he tells me he has had possibly colon cancer or bladder cancer, I cannot find those records.    Medications  Current Outpatient Medications   Medication Sig Dispense Refill    bimatoprost (LUMIGAN) 0.01 % Drop [BIMATOPROST (LUMIGAN) 0.01 % DROP] Administer 1 drop to both eyes every evening.      brimonidine-timolol (COMBIGAN) 0.2-0.5 % ophthalmic solution Place 1 drop into the right eye 2 times daily      Calcium Carb-Cholecalciferol (CALCIUM 600+D3 PO) Take 1 tablet by mouth daily      doxazosin (CARDURA) 2 MG tablet Take 1 tablet by mouth at bedtime      finasteride (PROSCAR) 5 mg tablet [FINASTERIDE (PROSCAR) 5 MG TABLET] Take 5 mg by mouth daily.      irbesartan (AVAPRO) 300 MG tablet Take 1 tablet by mouth daily      labetalol (NORMODYNE) 200 MG tablet Take 200 mg by mouth 2 times daily      MEDICATION CANNOT BE REORDERED - PLEASE MANUALLY REORDER AND DISCONTINUE THE OLD ORDER [CA/D3/MAG OX/ZINC//DANTE/BOR (CALCIUM 600+D3 PLUS ORAL)] Take 2-3 tablets by mouth daily.       MULTIVITAMIN (MULTIPLE VITAMIN ORAL) [MULTIVITAMIN (MULTIPLE VITAMIN ORAL)] Take 1 tablet by mouth daily.      omeprazole (PRILOSEC) 20 MG capsule [OMEPRAZOLE (PRILOSEC) 20 MG CAPSULE] Take 1 capsule (20 mg total) by mouth daily. 90 capsule 4    potassium chloride ER (KLOR-CON M) 20 MEQ CR tablet Take 1 tablet by mouth 2 times daily      simvastatin (ZOCOR) 20 MG tablet [SIMVASTATIN (ZOCOR) 20 MG TABLET] Take 10 mg by mouth at bedtime.          Objective  /52 (BP Location: Right arm, Patient Position: Sitting, Cuff  "Size: Adult Regular)   Pulse 75   Resp 18   Wt 85.7 kg (189 lb)   SpO2 97%   BMI 26.74 kg/m      General Appearance:    Alert, cooperative, no distress, appears stated age   Head:    Normocephalic, without obvious abnormality, atraumatic   Throat:   Lips, mucosa, and tongue normal; teeth and gums normal   Neck:   Supple, symmetrical, trachea midline, no adenopathy;        thyroid:  No enlargement/tenderness/nodules; no carotid    bruit or JVD   Back:     Symmetric, no curvature, ROM normal, no CVA tenderness   Lungs:     Clear to auscultation bilaterally, respirations unlabored   Chest wall:    No tenderness, midline sternotomy scar   Heart:    Regular rate and rhythm, S1 and S2 normal, no murmur, rub   or gallop   Abdomen:     Soft, non-tender, bowel sounds active all four quadrants,     no masses, no organomegaly   Extremities:   Normal, atraumatic, no cyanosis or edema   Pulses:   2+ and symmetric all extremities   Skin:   Skin color, texture, turgor normal, no rashes or lesions     Results    Lab Results personally reviewed   Lab Results   Component Value Date    CHOL 109 10/18/2016    CHOL 126 05/15/2012     Lab Results   Component Value Date    HDL 32 (L) 10/18/2016    HDL 36 (L) 05/15/2012     No components found for: \"LDLCALC\"  Lab Results   Component Value Date    TRIG 288 (H) 10/18/2016    TRIG 93 05/15/2012     No results found for: \"WBC\", \"HGB\", \"HCT\", \"PLT\"  No results found for: \"CREATININE\", \"BUN\", \"NA\", \"CO2\"              Thank you for allowing me to participate in the care of your patient.      Sincerely,     ARMEN DOLL MD     [facility]  cc:   No referring provider defined for this encounter.      "

## 2023-10-11 NOTE — PROGRESS NOTES
[facility]  Heart Care Clinic Follow-up Note    Assessment & Plan        (I25.10,  I25.83) Coronary atherosclerosis due to lipid rich plaque  (primary encounter diagnosis)  Comment: Angiography in 2005 showed a normal left main, totally occluded proximal left anterior descending, circumflex unremarkable with an obtuse marginal artery one with a 10% stenosis. The right coronary artery had a 10-20% proximal stenosis, a 10% mid stenosis, and a distal 95% stenosis which received a drug-coated stent at that time.  Symptomatically having shortness of breath, had normal stress test in September 2020, given that bypass was so long ago we will repeat stress pharmacological nuclear as he is unable to exercise on treadmill.    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 He has increased shortness of breath and will perform pharmacological stress nuclear as above.    (E78.00) Hypercholesteremia  Comment: Total cholesterol is 112, continue current low-dose statin.    (I10) Hypertension  Comment: Resistant hypertension, on irbesartan, labetalol, as well as 2 alpha blockers.  Good control currently.    (N40.1) Benign prostatic hyperplasia with lower urinary tract symptoms, symptom details unspecified  Comment: Currently on Cardura as well as Proscar, no significant urinary symptoms.    Plan  1.  Pharmacological stress nuclear, if abnormal pursue angiography.  2.  Follow-up with me in several months to go over the results of these.  3.  Stress test is normal we will check echo.    Subjective  CC: 83-year-old white gentleman here for visit after having not seen me in over 3 years.  He tells me he is noted over the last several months increased shortness of breath and activity such as walking to the mailbox or picking weeds.  There is no PND, orthopnea, cough, sputum  production, chest pains, or palpitations.  He does tell me he is drinking about 3 brandies a day, lives at home independently with his wife in their house, he seems a little bit more hard of hearing, and since I seen him he tells me he has had possibly colon cancer or bladder cancer, I cannot find those records.    Medications  Current Outpatient Medications   Medication Sig Dispense Refill    bimatoprost (LUMIGAN) 0.01 % Drop [BIMATOPROST (LUMIGAN) 0.01 % DROP] Administer 1 drop to both eyes every evening.      brimonidine-timolol (COMBIGAN) 0.2-0.5 % ophthalmic solution Place 1 drop into the right eye 2 times daily      Calcium Carb-Cholecalciferol (CALCIUM 600+D3 PO) Take 1 tablet by mouth daily      doxazosin (CARDURA) 2 MG tablet Take 1 tablet by mouth at bedtime      finasteride (PROSCAR) 5 mg tablet [FINASTERIDE (PROSCAR) 5 MG TABLET] Take 5 mg by mouth daily.      irbesartan (AVAPRO) 300 MG tablet Take 1 tablet by mouth daily      labetalol (NORMODYNE) 200 MG tablet Take 200 mg by mouth 2 times daily      MEDICATION CANNOT BE REORDERED - PLEASE MANUALLY REORDER AND DISCONTINUE THE OLD ORDER [CA/D3/MAG OX/ZINC//DANTE/BOR (CALCIUM 600+D3 PLUS ORAL)] Take 2-3 tablets by mouth daily.       MULTIVITAMIN (MULTIPLE VITAMIN ORAL) [MULTIVITAMIN (MULTIPLE VITAMIN ORAL)] Take 1 tablet by mouth daily.      omeprazole (PRILOSEC) 20 MG capsule [OMEPRAZOLE (PRILOSEC) 20 MG CAPSULE] Take 1 capsule (20 mg total) by mouth daily. 90 capsule 4    potassium chloride ER (KLOR-CON M) 20 MEQ CR tablet Take 1 tablet by mouth 2 times daily      simvastatin (ZOCOR) 20 MG tablet [SIMVASTATIN (ZOCOR) 20 MG TABLET] Take 10 mg by mouth at bedtime.          Objective  /52 (BP Location: Right arm, Patient Position: Sitting, Cuff Size: Adult Regular)   Pulse 75   Resp 18   Wt 85.7 kg (189 lb)   SpO2 97%   BMI 26.74 kg/m      General Appearance:    Alert, cooperative, no distress, appears stated age   Head:    Normocephalic,  "without obvious abnormality, atraumatic   Throat:   Lips, mucosa, and tongue normal; teeth and gums normal   Neck:   Supple, symmetrical, trachea midline, no adenopathy;        thyroid:  No enlargement/tenderness/nodules; no carotid    bruit or JVD   Back:     Symmetric, no curvature, ROM normal, no CVA tenderness   Lungs:     Clear to auscultation bilaterally, respirations unlabored   Chest wall:    No tenderness, midline sternotomy scar   Heart:    Regular rate and rhythm, S1 and S2 normal, no murmur, rub   or gallop   Abdomen:     Soft, non-tender, bowel sounds active all four quadrants,     no masses, no organomegaly   Extremities:   Normal, atraumatic, no cyanosis or edema   Pulses:   2+ and symmetric all extremities   Skin:   Skin color, texture, turgor normal, no rashes or lesions     Results    Lab Results personally reviewed   Lab Results   Component Value Date    CHOL 109 10/18/2016    CHOL 126 05/15/2012     Lab Results   Component Value Date    HDL 32 (L) 10/18/2016    HDL 36 (L) 05/15/2012     No components found for: \"LDLCALC\"  Lab Results   Component Value Date    TRIG 288 (H) 10/18/2016    TRIG 93 05/15/2012     No results found for: \"WBC\", \"HGB\", \"HCT\", \"PLT\"  No results found for: \"CREATININE\", \"BUN\", \"NA\", \"CO2\"          "

## 2023-10-18 ENCOUNTER — HOSPITAL ENCOUNTER (OUTPATIENT)
Dept: NUCLEAR MEDICINE | Facility: HOSPITAL | Age: 83
Discharge: HOME | End: 2023-10-18
Attending: INTERNAL MEDICINE
Payer: COMMERCIAL

## 2023-10-18 ENCOUNTER — HOSPITAL ENCOUNTER (OUTPATIENT)
Dept: CARDIOLOGY | Facility: HOSPITAL | Age: 83
Discharge: HOME | End: 2023-10-18
Attending: INTERNAL MEDICINE
Payer: COMMERCIAL

## 2023-10-18 DIAGNOSIS — Z95.1 S/P CABG X 3: ICD-10-CM

## 2023-10-18 DIAGNOSIS — I25.83 CORONARY ATHEROSCLEROSIS DUE TO LIPID RICH PLAQUE: ICD-10-CM

## 2023-10-18 LAB
CV STRESS CURRENT BP HE: NORMAL
CV STRESS CURRENT HR HE: 64
CV STRESS CURRENT HR HE: 64
CV STRESS CURRENT HR HE: 66
CV STRESS CURRENT HR HE: 67
CV STRESS CURRENT HR HE: 68
CV STRESS CURRENT HR HE: 68
CV STRESS CURRENT HR HE: 69
CV STRESS CURRENT HR HE: 70
CV STRESS CURRENT HR HE: 71
CV STRESS CURRENT HR HE: 74
CV STRESS CURRENT HR HE: 75
CV STRESS CURRENT HR HE: 75
CV STRESS DEVIATION TIME HE: NORMAL
CV STRESS ECHO PERCENT HR HE: NORMAL
CV STRESS EXERCISE STAGE HE: NORMAL
CV STRESS FINAL RESTING BP HE: NORMAL
CV STRESS FINAL RESTING HR HE: 70
CV STRESS MAX HR HE: 75
CV STRESS MAX TREADMILL GRADE HE: 0
CV STRESS MAX TREADMILL SPEED HE: 0
CV STRESS PEAK DIA BP HE: NORMAL
CV STRESS PEAK SYS BP HE: NORMAL
CV STRESS PHASE HE: NORMAL
CV STRESS PROTOCOL HE: NORMAL
CV STRESS RESTING PT POSITION HE: NORMAL
CV STRESS ST DEVIATION AMOUNT HE: NORMAL
CV STRESS ST DEVIATION ELEVATION HE: NORMAL
CV STRESS ST EVELATION AMOUNT HE: NORMAL
CV STRESS TEST TYPE HE: NORMAL
CV STRESS TOTAL STAGE TIME MIN 1 HE: NORMAL
NUC STRESS EJECTION FRACTION: 70 %
RATE PRESSURE PRODUCT: 9825
STRESS ECHO BASELINE DIASTOLIC HE: 74
STRESS ECHO BASELINE HR: 64
STRESS ECHO BASELINE SYSTOLIC BP: 170
STRESS ECHO CALCULATED PERCENT HR: 56 %
STRESS ECHO LAST STRESS DIASTOLIC BP: 64
STRESS ECHO LAST STRESS HR: 71
STRESS ECHO LAST STRESS SYSTOLIC BP: 131
STRESS ECHO TARGET HR: 135

## 2023-10-18 PROCEDURE — A9500 TC99M SESTAMIBI: HCPCS | Performed by: INTERNAL MEDICINE

## 2023-10-18 PROCEDURE — 250N000011 HC RX IP 250 OP 636: Performed by: INTERNAL MEDICINE

## 2023-10-18 PROCEDURE — 78452 HT MUSCLE IMAGE SPECT MULT: CPT | Mod: 26 | Performed by: INTERNAL MEDICINE

## 2023-10-18 PROCEDURE — 343N000001 HC RX 343: Performed by: INTERNAL MEDICINE

## 2023-10-18 PROCEDURE — 93016 CV STRESS TEST SUPVJ ONLY: CPT | Performed by: INTERNAL MEDICINE

## 2023-10-18 PROCEDURE — 78452 HT MUSCLE IMAGE SPECT MULT: CPT

## 2023-10-18 PROCEDURE — 93017 CV STRESS TEST TRACING ONLY: CPT

## 2023-10-18 PROCEDURE — 93018 CV STRESS TEST I&R ONLY: CPT | Performed by: INTERNAL MEDICINE

## 2023-10-18 RX ORDER — AMINOPHYLLINE 25 MG/ML
50 INJECTION, SOLUTION INTRAVENOUS
Status: DISCONTINUED | OUTPATIENT
Start: 2023-10-18 | End: 2023-10-18 | Stop reason: HOSPADM

## 2023-10-18 RX ORDER — REGADENOSON 0.08 MG/ML
0.4 INJECTION, SOLUTION INTRAVENOUS ONCE
Status: COMPLETED | OUTPATIENT
Start: 2023-10-18 | End: 2023-10-18

## 2023-10-18 RX ADMIN — Medication 8.7 MILLICURIE: at 07:08

## 2023-10-18 RX ADMIN — REGADENOSON 0.4 MG: 0.08 INJECTION, SOLUTION INTRAVENOUS at 08:01

## 2023-10-18 RX ADMIN — Medication 30.4 MILLICURIE: at 09:03

## 2023-10-23 ENCOUNTER — TELEPHONE (OUTPATIENT)
Dept: CARDIOLOGY | Facility: CLINIC | Age: 83
End: 2023-10-23
Payer: COMMERCIAL

## 2023-10-23 DIAGNOSIS — R06.09 DYSPNEA ON EXERTION: ICD-10-CM

## 2023-10-23 DIAGNOSIS — Z95.1 S/P CABG X 3: Primary | ICD-10-CM

## 2023-10-23 NOTE — TELEPHONE ENCOUNTER
----- Message from Yuli Van RN sent at 10/23/2023  1:12 PM CDT -----  ----- Message -----  From: Pb Lu MD  Sent: 10/19/2023   2:48 PM CDT  To: Yuli Van RN    Let him know that stress test suggest no new blockages.  If he is still having shortness of breath can we please arrange for echo to evaluate valves.  LF

## 2023-11-13 ENCOUNTER — HOSPITAL ENCOUNTER (OUTPATIENT)
Dept: CARDIOLOGY | Facility: HOSPITAL | Age: 83
Discharge: HOME | End: 2023-11-13
Attending: INTERNAL MEDICINE | Admitting: INTERNAL MEDICINE
Payer: COMMERCIAL

## 2023-11-13 DIAGNOSIS — Z95.1 S/P CABG X 3: ICD-10-CM

## 2023-11-13 DIAGNOSIS — R06.09 DYSPNEA ON EXERTION: ICD-10-CM

## 2023-11-13 LAB — LVEF ECHO: NORMAL

## 2023-11-13 PROCEDURE — 93306 TTE W/DOPPLER COMPLETE: CPT | Mod: 26 | Performed by: INTERNAL MEDICINE

## 2023-11-13 PROCEDURE — 999N000208 ECHOCARDIOGRAM COMPLETE

## 2023-11-13 PROCEDURE — 255N000002 HC RX 255 OP 636: Performed by: INTERNAL MEDICINE

## 2023-11-13 RX ADMIN — PERFLUTREN 4 ML: 6.52 INJECTION, SUSPENSION INTRAVENOUS at 09:59

## 2023-12-21 ENCOUNTER — TRANSFERRED RECORDS (OUTPATIENT)
Dept: HEALTH INFORMATION MANAGEMENT | Facility: CLINIC | Age: 83
End: 2023-12-21

## 2024-02-07 ENCOUNTER — OFFICE VISIT (OUTPATIENT)
Dept: CARDIOLOGY | Facility: CLINIC | Age: 84
End: 2024-02-07
Payer: COMMERCIAL

## 2024-02-07 VITALS
BODY MASS INDEX: 26.17 KG/M2 | HEART RATE: 74 BPM | WEIGHT: 185 LBS | DIASTOLIC BLOOD PRESSURE: 70 MMHG | SYSTOLIC BLOOD PRESSURE: 130 MMHG | RESPIRATION RATE: 16 BRPM

## 2024-02-07 DIAGNOSIS — I10 ESSENTIAL HYPERTENSION: ICD-10-CM

## 2024-02-07 DIAGNOSIS — R06.09 DYSPNEA ON EXERTION: ICD-10-CM

## 2024-02-07 DIAGNOSIS — N40.1 BENIGN PROSTATIC HYPERPLASIA WITH LOWER URINARY TRACT SYMPTOMS, SYMPTOM DETAILS UNSPECIFIED: ICD-10-CM

## 2024-02-07 DIAGNOSIS — E78.00 HYPERCHOLESTEREMIA: ICD-10-CM

## 2024-02-07 DIAGNOSIS — Z95.1 S/P CABG X 3: ICD-10-CM

## 2024-02-07 DIAGNOSIS — I25.83 CORONARY ATHEROSCLEROSIS DUE TO LIPID RICH PLAQUE: Primary | ICD-10-CM

## 2024-02-07 PROCEDURE — 99214 OFFICE O/P EST MOD 30 MIN: CPT | Performed by: INTERNAL MEDICINE

## 2024-02-07 NOTE — LETTER
2/7/2024    Zhang Busch,   [address]  [place] [address]    RE: John Gant       Dear Colleague,     I had the pleasure of seeing John Gant in the [facility].      [facility]  Heart Care Clinic Follow-up Note    Assessment & Plan        (I25.10,  I25.83) Coronary atherosclerosis due to lipid rich plaque  (primary encounter diagnosis)  Comment: Angiography in 2005 showed a normal left main, totally occluded proximal left anterior descending, circumflex unremarkable with an obtuse marginal artery one with a 10% stenosis. The right coronary artery had a 10-20% proximal stenosis, a 10% mid stenosis, and a distal 95% stenosis which received a drug-coated stent at that time. Symptomatically having shortness of breath, had normal stress test in September 2020, given that bypass was so long ago repeated stress nuclear and this showed no ischemia or scar with preserved ejection fraction.     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 He has increased shortness of breath and as above stress test shows no ischemia, and on further questioning he really does not have any shortness of breath.      (E78.00) Hypercholesteremia  Comment: Total cholesterol is 112, continue current low-dose statin.     (I10) Hypertension  Comment: Resistant hypertension, on irbesartan, labetalol, as well as 2 alpha blockers.  Good control currently.     (N40.1) Benign prostatic hyperplasia with lower urinary tract symptoms, symptom details unspecified  Comment: Currently on Cardura as well as Proscar, no significant urinary symptoms.     (R06.09) Dyspnea on exertion  Comment: Records state he is here for shortness of breath, he is very noncommittal about shortness of breath, tells me in fact can go for walks with his wife without shortness of  breath.  He has a myriad of several complaints.  None of them appear to be cardiac, one of them is indigestion, one of them is posterior neck pain, one of them is orthostatic lightheadedness.  In addition blood work from primary clinic looked good with a normal hemoglobin.    Plan  1.  Suggest follow-up with his primary physician concerning posterior neck pain.  2.  Suggest to get up slowly given his orthostasis.  3.  Suggest he speak to his primary concerning his increased belching, can consider increasing proton pump inhibitor to 2 a day.  4.  Follow-up with me in 6 months or sooner if needed.    5.  Decrease his brandies from 4-day to 2 a day.    Subjective  CC: 83-year-old white gentleman here for an add-on urgent visit.  Apparently he was sent in for worse shortness of breath by his primary physician, unfortunately patient is not quite aware why he is here today.  When asked about shortness of breath he declines, telling me he can walk without shortness of breath, does not wake up gasping for breath.  When asked of specific complaints he states when he gets up quickly he gets lightheaded.  He also tells me he thinks when he sleeps a certain way the back of his neck will hurt him, especially if he moves his head from side-to-side.  He also tells me he has an indigestion or belching his chest that goes away once he has a good bowel movement.  When asked specifically about cardiac issues, there is no BLACKBURN, no PND, no orthopnea, no chest pain, no palpitations, no syncope, no peripheral edema.  He offers that he wonders if brandies are too much, he states he is drinking 3-4 brandies a day.  He even tells me he has 1 with breakfast.    Medications  Current Outpatient Medications   Medication Sig Dispense Refill    bimatoprost (LUMIGAN) 0.01 % Drop [BIMATOPROST (LUMIGAN) 0.01 % DROP] Administer 1 drop to both eyes every evening.      brimonidine-timolol (COMBIGAN) 0.2-0.5 % ophthalmic solution Place 1 drop into the  right eye 2 times daily      Calcium Carb-Cholecalciferol (CALCIUM 600+D3 PO) Take 1 tablet by mouth daily      doxazosin (CARDURA) 2 MG tablet Take 1 tablet by mouth at bedtime      finasteride (PROSCAR) 5 mg tablet [FINASTERIDE (PROSCAR) 5 MG TABLET] Take 5 mg by mouth daily.      irbesartan (AVAPRO) 300 MG tablet Take 1 tablet by mouth daily      labetalol (NORMODYNE) 200 MG tablet Take 200 mg by mouth 2 times daily      MULTIVITAMIN (MULTIPLE VITAMIN ORAL) [MULTIVITAMIN (MULTIPLE VITAMIN ORAL)] Take 1 tablet by mouth daily.      omeprazole (PRILOSEC) 20 MG capsule [OMEPRAZOLE (PRILOSEC) 20 MG CAPSULE] Take 1 capsule (20 mg total) by mouth daily. 90 capsule 4    potassium chloride ER (KLOR-CON M) 20 MEQ CR tablet Take 1 tablet by mouth daily      simvastatin (ZOCOR) 20 MG tablet [SIMVASTATIN (ZOCOR) 20 MG TABLET] Take 10 mg by mouth at bedtime.          Objective  /70 (BP Location: Left arm, Patient Position: Sitting, Cuff Size: Adult Regular)   Pulse 74   Resp 16   Wt 83.9 kg (185 lb)   BMI 26.17 kg/m      General Appearance:    Alert, cooperative, no distress, appears stated age   Head:    Normocephalic, without obvious abnormality, atraumatic   Throat:   Lips, mucosa, and tongue normal; teeth and gums normal   Neck:   Supple, symmetrical, trachea midline, no adenopathy;        thyroid:  No enlargement/tenderness/nodules; no carotid    bruit or JVD   Back:     Symmetric, no curvature, ROM normal, no CVA tenderness   Lungs:     Clear to auscultation bilaterally, respirations unlabored   Chest wall:    No tenderness, midline sternotomy scar   Heart:    Regular rate and rhythm, S1 and S2 normal, no murmur, rub   or gallop   Abdomen:     Soft, non-tender, bowel sounds active all four quadrants,     no masses, no organomegaly   Extremities:   Normal, atraumatic, no cyanosis or edema   Pulses:   2+ and symmetric all extremities   Skin:   Skin color, texture, turgor normal, no rashes or lesions  "    Results    Lab Results personally reviewed   Lab Results   Component Value Date    CHOL 109 10/18/2016    CHOL 126 05/15/2012     Lab Results   Component Value Date    HDL 32 (L) 10/18/2016    HDL 36 (L) 05/15/2012     No components found for: \"LDLCALC\"  Lab Results   Component Value Date    TRIG 288 (H) 10/18/2016    TRIG 93 05/15/2012     No results found for: \"WBC\", \"HGB\", \"HCT\", \"PLT\"  No results found for: \"CREATININE\", \"BUN\", \"NA\", \"CO2\"    Reviewed electrocardiogram sinus rhythm, within normal limits            Thank you for allowing me to participate in the care of your patient.      Sincerely,     ARMEN LU MD     [facility]  cc:   Armen Lu MD  [address], [address]  [place] [address]    "

## 2024-02-07 NOTE — PATIENT INSTRUCTIONS
Mr John Gant,  I enjoyed visiting with you again today.  I am glad to hear you are doing well.  Per our conversation I do not hear of shortness of breath limiting you. Get up slowly especially if having several daniel's a day, and try to cut down to 1-2 not 3-4.  The neck pain is probably arthritis.  I will plan on seeing you 6 months.  Pb Lu

## 2024-02-07 NOTE — PROGRESS NOTES
[facility]  Heart Care Clinic Follow-up Note    Assessment & Plan        (I25.10,  I25.83) Coronary atherosclerosis due to lipid rich plaque  (primary encounter diagnosis)  Comment: Angiography in 2005 showed a normal left main, totally occluded proximal left anterior descending, circumflex unremarkable with an obtuse marginal artery one with a 10% stenosis. The right coronary artery had a 10-20% proximal stenosis, a 10% mid stenosis, and a distal 95% stenosis which received a drug-coated stent at that time. Symptomatically having shortness of breath, had normal stress test in September 2020, given that bypass was so long ago repeated stress nuclear and this showed no ischemia or scar with preserved ejection fraction.     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 He has increased shortness of breath and as above stress test shows no ischemia, and on further questioning he really does not have any shortness of breath.      (E78.00) Hypercholesteremia  Comment: Total cholesterol is 112, continue current low-dose statin.     (I10) Hypertension  Comment: Resistant hypertension, on irbesartan, labetalol, as well as 2 alpha blockers.  Good control currently.     (N40.1) Benign prostatic hyperplasia with lower urinary tract symptoms, symptom details unspecified  Comment: Currently on Cardura as well as Proscar, no significant urinary symptoms.     (R06.09) Dyspnea on exertion  Comment: Records state he is here for shortness of breath, he is very noncommittal about shortness of breath, tells me in fact can go for walks with his wife without shortness of breath.  He has a myriad of several complaints.  None of them appear to be cardiac, one of them is indigestion, one of them is posterior neck pain, one of them is orthostatic lightheadedness.  In addition blood  work from primary clinic looked good with a normal hemoglobin.    Plan  1.  Suggest follow-up with his primary physician concerning posterior neck pain.  2.  Suggest to get up slowly given his orthostasis.  3.  Suggest he speak to his primary concerning his increased belching, can consider increasing proton pump inhibitor to 2 a day.  4.  Follow-up with me in 6 months or sooner if needed.    5.  Decrease his brandies from 4-day to 2 a day.    Subjective  CC: 83-year-old white gentleman here for an add-on urgent visit.  Apparently he was sent in for worse shortness of breath by his primary physician, unfortunately patient is not quite aware why he is here today.  When asked about shortness of breath he declines, telling me he can walk without shortness of breath, does not wake up gasping for breath.  When asked of specific complaints he states when he gets up quickly he gets lightheaded.  He also tells me he thinks when he sleeps a certain way the back of his neck will hurt him, especially if he moves his head from side-to-side.  He also tells me he has an indigestion or belching his chest that goes away once he has a good bowel movement.  When asked specifically about cardiac issues, there is no BLACKBURN, no PND, no orthopnea, no chest pain, no palpitations, no syncope, no peripheral edema.  He offers that he wonders if brandies are too much, he states he is drinking 3-4 brandies a day.  He even tells me he has 1 with breakfast.    Medications  Current Outpatient Medications   Medication Sig Dispense Refill    bimatoprost (LUMIGAN) 0.01 % Drop [BIMATOPROST (LUMIGAN) 0.01 % DROP] Administer 1 drop to both eyes every evening.      brimonidine-timolol (COMBIGAN) 0.2-0.5 % ophthalmic solution Place 1 drop into the right eye 2 times daily      Calcium Carb-Cholecalciferol (CALCIUM 600+D3 PO) Take 1 tablet by mouth daily      doxazosin (CARDURA) 2 MG tablet Take 1 tablet by mouth at bedtime      finasteride (PROSCAR) 5 mg  tablet [FINASTERIDE (PROSCAR) 5 MG TABLET] Take 5 mg by mouth daily.      irbesartan (AVAPRO) 300 MG tablet Take 1 tablet by mouth daily      labetalol (NORMODYNE) 200 MG tablet Take 200 mg by mouth 2 times daily      MULTIVITAMIN (MULTIPLE VITAMIN ORAL) [MULTIVITAMIN (MULTIPLE VITAMIN ORAL)] Take 1 tablet by mouth daily.      omeprazole (PRILOSEC) 20 MG capsule [OMEPRAZOLE (PRILOSEC) 20 MG CAPSULE] Take 1 capsule (20 mg total) by mouth daily. 90 capsule 4    potassium chloride ER (KLOR-CON M) 20 MEQ CR tablet Take 1 tablet by mouth daily      simvastatin (ZOCOR) 20 MG tablet [SIMVASTATIN (ZOCOR) 20 MG TABLET] Take 10 mg by mouth at bedtime.          Objective  /70 (BP Location: Left arm, Patient Position: Sitting, Cuff Size: Adult Regular)   Pulse 74   Resp 16   Wt 83.9 kg (185 lb)   BMI 26.17 kg/m      General Appearance:    Alert, cooperative, no distress, appears stated age   Head:    Normocephalic, without obvious abnormality, atraumatic   Throat:   Lips, mucosa, and tongue normal; teeth and gums normal   Neck:   Supple, symmetrical, trachea midline, no adenopathy;        thyroid:  No enlargement/tenderness/nodules; no carotid    bruit or JVD   Back:     Symmetric, no curvature, ROM normal, no CVA tenderness   Lungs:     Clear to auscultation bilaterally, respirations unlabored   Chest wall:    No tenderness, midline sternotomy scar   Heart:    Regular rate and rhythm, S1 and S2 normal, no murmur, rub   or gallop   Abdomen:     Soft, non-tender, bowel sounds active all four quadrants,     no masses, no organomegaly   Extremities:   Normal, atraumatic, no cyanosis or edema   Pulses:   2+ and symmetric all extremities   Skin:   Skin color, texture, turgor normal, no rashes or lesions     Results    Lab Results personally reviewed   Lab Results   Component Value Date    CHOL 109 10/18/2016    CHOL 126 05/15/2012     Lab Results   Component Value Date    HDL 32 (L) 10/18/2016    HDL 36 (L) 05/15/2012  "    No components found for: \"LDLCALC\"  Lab Results   Component Value Date    TRIG 288 (H) 10/18/2016    TRIG 93 05/15/2012     No results found for: \"WBC\", \"HGB\", \"HCT\", \"PLT\"  No results found for: \"CREATININE\", \"BUN\", \"NA\", \"CO2\"    Reviewed electrocardiogram sinus rhythm, within normal limits          "

## 2024-04-23 NOTE — TELEPHONE ENCOUNTER
Called patient and updated on results of stress test. He is reassured but notes he is still having dyspnea on exertion and decrease in activity tolerance. Echo ordered and will have this arranged + LBF follow up. -melvin    Report given SHRUTHI Sarmiento RN

## 2024-06-03 ENCOUNTER — OFFICE VISIT (OUTPATIENT)
Dept: CARDIOLOGY | Facility: CLINIC | Age: 84
End: 2024-06-03
Payer: COMMERCIAL

## 2024-06-03 VITALS
SYSTOLIC BLOOD PRESSURE: 117 MMHG | WEIGHT: 177 LBS | RESPIRATION RATE: 16 BRPM | HEART RATE: 87 BPM | OXYGEN SATURATION: 96 % | BODY MASS INDEX: 25.04 KG/M2 | DIASTOLIC BLOOD PRESSURE: 69 MMHG

## 2024-06-03 DIAGNOSIS — N40.1 BENIGN PROSTATIC HYPERPLASIA WITH LOWER URINARY TRACT SYMPTOMS, SYMPTOM DETAILS UNSPECIFIED: ICD-10-CM

## 2024-06-03 DIAGNOSIS — I25.83 CORONARY ATHEROSCLEROSIS DUE TO LIPID RICH PLAQUE: Primary | ICD-10-CM

## 2024-06-03 DIAGNOSIS — Z95.1 S/P CABG X 3: ICD-10-CM

## 2024-06-03 DIAGNOSIS — E78.00 HYPERCHOLESTEREMIA: ICD-10-CM

## 2024-06-03 DIAGNOSIS — I10 ESSENTIAL HYPERTENSION: ICD-10-CM

## 2024-06-03 PROCEDURE — 93000 ELECTROCARDIOGRAM COMPLETE: CPT | Performed by: STUDENT IN AN ORGANIZED HEALTH CARE EDUCATION/TRAINING PROGRAM

## 2024-06-03 PROCEDURE — 99214 OFFICE O/P EST MOD 30 MIN: CPT | Performed by: INTERNAL MEDICINE

## 2024-06-03 PROCEDURE — G2211 COMPLEX E/M VISIT ADD ON: HCPCS | Performed by: INTERNAL MEDICINE

## 2024-06-03 RX ORDER — TAMSULOSIN HYDROCHLORIDE 0.4 MG/1
0.4 CAPSULE ORAL DAILY
COMMUNITY

## 2024-06-03 RX ORDER — HYDROCHLOROTHIAZIDE 12.5 MG/1
12.5 TABLET ORAL DAILY
COMMUNITY

## 2024-06-03 RX ORDER — FENOFIBRATE 54 MG/1
54 TABLET ORAL DAILY
COMMUNITY

## 2024-06-03 NOTE — PATIENT INSTRUCTIONS
Mr John Gant,  I enjoyed visiting with you again today.  I am sorry to hear of the imbalance.  Per our conversation stop the hydrochlorothiazide and the potassium.  After about a week check some pressures and and call to me at [phone] as still on 3 prostate pills which can lower pressures as well as IRBESARTAN as well as eye drops.  Let us recheck the echo.  I will plan on seeing you 6 months.  Pb Lu

## 2024-06-03 NOTE — PROGRESS NOTES
[facility]  Heart Care Clinic Follow-up Note    Assessment & Plan          (I25.10,  I25.83) Coronary atherosclerosis due to lipid rich plaque  (primary encounter diagnosis)  Comment: Angiography in 2005 showed a normal left main, totally occluded proximal left anterior descending, circumflex unremarkable with an obtuse marginal artery one with a 10% stenosis. The right coronary artery had a 10-20% proximal stenosis, a 10% mid stenosis, and a distal 95% stenosis which received a drug-coated stent at that time. Symptomatically doing well, had normal stress test in September 2020, given that bypass was so long ago repeated stress nuclear October 2023 and this showed no ischemia or scar with preserved ejection fraction.     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  Stress test as above with good-looking echo as well.     (E78.00) Hypercholesteremia  Comment: Total cholesterol is 112, continue current low-dose statin.     (I10) Hypertension  Comment: Refractory hypertension, was on irbesartan, labetalol, as well as 3 alpha blockers as well as HCTZ.  Good control currently.  Due to orthostasis his labetalol was discontinued, given continued orthostasis we will discontinue the HCTZ and potassium.  I will have his wife check his blood pressures in about a week and if need be we will then cut the irbesartan down to 150.  ECG does not show any arrhythmia, will check the echocardiogram given that this is all sudden onset.  Blood work in December looks good and defer to primary whether to rule out infectious etiology, endocrine etiology, or bleeding or renal dysfunction.  Will recheck blood work in 1 week given the stopping of the HCTZ to see if we need to change potassium dose.  At that time we will check hemoglobin.     (N40.1) Benign prostatic hyperplasia  with lower urinary tract symptoms, symptom details unspecified  Comment: Currently on Cardura as well as Proscar as well as Flomax, no significant urinary symptoms.  As above, all 3 of these could lower blood pressures.    Plan  1.  Check echocardiogram looking for insidious LV dysfunction although echo was normal in November 2023 and stress test was normal October 23.  2.  Defer to primary clinic, question whether we need to check more recent blood work such as hemoglobin, cortisol levels, infectious etiology and creatinine.  3.  Discontinue HCTZ and potassium.  4.  Check blood pressures at home and if still lower will cut it restart down to 150.  5.  Recheck renal profile in a week.  Will get hemoglobin at that time and evaluate potassium levels.  6.  Follow-up with me in 6 months or sooner if needed given all these issues.    Subjective  CC: 83-year-old white gentleman here for an urgent add-on visit with his wife present.  Since I seen him he is remains orthostatic, has had his labetalol discontinued.  If he gets up quickly he gets lightheaded.  He is still living in a house with his wife, does occasional chores around the house but nothing outside.  He now has a draining lesion involving his right ear and is going to be seen for that.  There is no chest pains, palpitations, significant shortness of breath, PND, orthopnea or significant peripheral edema.    Medications  Current Outpatient Medications   Medication Sig Dispense Refill    bimatoprost (LUMIGAN) 0.01 % Drop [BIMATOPROST (LUMIGAN) 0.01 % DROP] Administer 1 drop to both eyes every evening.      brimonidine-timolol (COMBIGAN) 0.2-0.5 % ophthalmic solution Place 1 drop into the right eye 2 times daily      Calcium Carb-Cholecalciferol (CALCIUM 600+D3 PO) Take 1 tablet by mouth daily      doxazosin (CARDURA) 2 MG tablet Take 1 tablet by mouth at bedtime      fenofibrate (LOFIBRA) 54 MG tablet Take 54 mg by mouth daily      finasteride (PROSCAR) 5 mg  tablet [FINASTERIDE (PROSCAR) 5 MG TABLET] Take 5 mg by mouth daily.      hydroCHLOROthiazide 12.5 MG tablet Take 12.5 mg by mouth daily      irbesartan (AVAPRO) 300 MG tablet Take 1 tablet by mouth daily      MULTIVITAMIN (MULTIPLE VITAMIN ORAL) [MULTIVITAMIN (MULTIPLE VITAMIN ORAL)] Take 1 tablet by mouth daily.      omeprazole (PRILOSEC) 20 MG capsule [OMEPRAZOLE (PRILOSEC) 20 MG CAPSULE] Take 1 capsule (20 mg total) by mouth daily. 90 capsule 4    potassium chloride ER (KLOR-CON M) 20 MEQ CR tablet Take 1 tablet by mouth daily      simvastatin (ZOCOR) 20 MG tablet Take 20 mg by mouth at bedtime      tamsulosin (FLOMAX) 0.4 MG capsule Take 0.4 mg by mouth daily         Objective  /69 (BP Location: Right arm, Patient Position: Sitting, Cuff Size: Adult Regular)   Pulse 87   Resp 16   Wt 80.3 kg (177 lb)   SpO2 96%   BMI 25.04 kg/m      General Appearance:    Alert, cooperative, no distress, appears stated age   Head:    Normocephalic, without obvious abnormality, atraumatic   Throat:   Lips, mucosa, and tongue normal; teeth and gums normal   Neck:   Supple, symmetrical, trachea midline, no adenopathy;        thyroid:  No enlargement/tenderness/nodules; no carotid    bruit or JVD   Back:     Symmetric, no curvature, ROM normal, no CVA tenderness   Lungs:     Clear to auscultation bilaterally, respirations unlabored   Chest wall:    No tenderness, midline sternotomy scar   Heart:    Regular rate and rhythm, S1 and S2 normal, no murmur, rub   or gallop   Abdomen:     Soft, non-tender, bowel sounds active all four quadrants,     no masses, no organomegaly   Extremities:   Normal, atraumatic, no cyanosis or edema   Pulses:   2+ and symmetric all extremities   Skin:   Skin color, texture, turgor normal, no rashes or lesions     Results    Lab Results personally reviewed   Lab Results   Component Value Date    CHOL 109 10/18/2016    CHOL 126 05/15/2012     Lab Results   Component Value Date    HDL 32 (L)  "10/18/2016    HDL 36 (L) 05/15/2012     No components found for: \"LDLCALC\"  Lab Results   Component Value Date    TRIG 288 (H) 10/18/2016    TRIG 93 05/15/2012     No results found for: \"WBC\", \"HGB\", \"HCT\", \"PLT\"  No results found for: \"CREATININE\", \"BUN\", \"NA\", \"CO2\"    Reviewed electrocardiogram normal sinus rhythm, nonspecific ST-T wave changes, borderline prolonged corrected QT interval.          "

## 2024-06-03 NOTE — LETTER
6/3/2024    Zhang Busch,   [address]  [place] [address]    RE: John Gant       Dear Colleague,     I had the pleasure of seeing John Gant in the [facility].      [facility]  Heart Care Clinic Follow-up Note    Assessment & Plan          (I25.10,  I25.83) Coronary atherosclerosis due to lipid rich plaque  (primary encounter diagnosis)  Comment: Angiography in 2005 showed a normal left main, totally occluded proximal left anterior descending, circumflex unremarkable with an obtuse marginal artery one with a 10% stenosis. The right coronary artery had a 10-20% proximal stenosis, a 10% mid stenosis, and a distal 95% stenosis which received a drug-coated stent at that time. Symptomatically doing well, had normal stress test in September 2020, given that bypass was so long ago repeated stress nuclear October 2023 and this showed no ischemia or scar with preserved ejection fraction.     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  Stress test as above with good-looking echo as well.     (E78.00) Hypercholesteremia  Comment: Total cholesterol is 112, continue current low-dose statin.     (I10) Hypertension  Comment: Refractory hypertension, was on irbesartan, labetalol, as well as 3 alpha blockers as well as HCTZ.  Good control currently.  Due to orthostasis his labetalol was discontinued, given continued orthostasis we will discontinue the HCTZ and potassium.  I will have his wife check his blood pressures in about a week and if need be we will then cut the irbesartan down to 150.  ECG does not show any arrhythmia, will check the echocardiogram given that this is all sudden onset.  Blood work in December looks good and defer to primary whether to rule out infectious etiology, endocrine etiology, or bleeding or renal  dysfunction.  Will recheck blood work in 1 week given the stopping of the HCTZ to see if we need to change potassium dose.  At that time we will check hemoglobin.     (N40.1) Benign prostatic hyperplasia with lower urinary tract symptoms, symptom details unspecified  Comment: Currently on Cardura as well as Proscar as well as Flomax, no significant urinary symptoms.  As above, all 3 of these could lower blood pressures.    Plan  1.  Check echocardiogram looking for insidious LV dysfunction although echo was normal in November 2023 and stress test was normal October 23.  2.  Defer to primary clinic, question whether we need to check more recent blood work such as hemoglobin, cortisol levels, infectious etiology and creatinine.  3.  Discontinue HCTZ and potassium.  4.  Check blood pressures at home and if still lower will cut it restart down to 150.  5.  Recheck renal profile in a week.  Will get hemoglobin at that time and evaluate potassium levels.  6.  Follow-up with me in 6 months or sooner if needed given all these issues.    Subjective  CC: 83-year-old white gentleman here for an urgent add-on visit with his wife present.  Since I seen him he is remains orthostatic, has had his labetalol discontinued.  If he gets up quickly he gets lightheaded.  He is still living in a house with his wife, does occasional chores around the house but nothing outside.  He now has a draining lesion involving his right ear and is going to be seen for that.  There is no chest pains, palpitations, significant shortness of breath, PND, orthopnea or significant peripheral edema.    Medications  Current Outpatient Medications   Medication Sig Dispense Refill    bimatoprost (LUMIGAN) 0.01 % Drop [BIMATOPROST (LUMIGAN) 0.01 % DROP] Administer 1 drop to both eyes every evening.      brimonidine-timolol (COMBIGAN) 0.2-0.5 % ophthalmic solution Place 1 drop into the right eye 2 times daily      Calcium Carb-Cholecalciferol (CALCIUM 600+D3 PO)  Take 1 tablet by mouth daily      doxazosin (CARDURA) 2 MG tablet Take 1 tablet by mouth at bedtime      fenofibrate (LOFIBRA) 54 MG tablet Take 54 mg by mouth daily      finasteride (PROSCAR) 5 mg tablet [FINASTERIDE (PROSCAR) 5 MG TABLET] Take 5 mg by mouth daily.      hydroCHLOROthiazide 12.5 MG tablet Take 12.5 mg by mouth daily      irbesartan (AVAPRO) 300 MG tablet Take 1 tablet by mouth daily      MULTIVITAMIN (MULTIPLE VITAMIN ORAL) [MULTIVITAMIN (MULTIPLE VITAMIN ORAL)] Take 1 tablet by mouth daily.      omeprazole (PRILOSEC) 20 MG capsule [OMEPRAZOLE (PRILOSEC) 20 MG CAPSULE] Take 1 capsule (20 mg total) by mouth daily. 90 capsule 4    potassium chloride ER (KLOR-CON M) 20 MEQ CR tablet Take 1 tablet by mouth daily      simvastatin (ZOCOR) 20 MG tablet Take 20 mg by mouth at bedtime      tamsulosin (FLOMAX) 0.4 MG capsule Take 0.4 mg by mouth daily         Objective  /69 (BP Location: Right arm, Patient Position: Sitting, Cuff Size: Adult Regular)   Pulse 87   Resp 16   Wt 80.3 kg (177 lb)   SpO2 96%   BMI 25.04 kg/m      General Appearance:    Alert, cooperative, no distress, appears stated age   Head:    Normocephalic, without obvious abnormality, atraumatic   Throat:   Lips, mucosa, and tongue normal; teeth and gums normal   Neck:   Supple, symmetrical, trachea midline, no adenopathy;        thyroid:  No enlargement/tenderness/nodules; no carotid    bruit or JVD   Back:     Symmetric, no curvature, ROM normal, no CVA tenderness   Lungs:     Clear to auscultation bilaterally, respirations unlabored   Chest wall:    No tenderness, midline sternotomy scar   Heart:    Regular rate and rhythm, S1 and S2 normal, no murmur, rub   or gallop   Abdomen:     Soft, non-tender, bowel sounds active all four quadrants,     no masses, no organomegaly   Extremities:   Normal, atraumatic, no cyanosis or edema   Pulses:   2+ and symmetric all extremities   Skin:   Skin color, texture, turgor normal, no rashes  "or lesions     Results    Lab Results personally reviewed   Lab Results   Component Value Date    CHOL 109 10/18/2016    CHOL 126 05/15/2012     Lab Results   Component Value Date    HDL 32 (L) 10/18/2016    HDL 36 (L) 05/15/2012     No components found for: \"LDLCALC\"  Lab Results   Component Value Date    TRIG 288 (H) 10/18/2016    TRIG 93 05/15/2012     No results found for: \"WBC\", \"HGB\", \"HCT\", \"PLT\"  No results found for: \"CREATININE\", \"BUN\", \"NA\", \"CO2\"    Reviewed electrocardiogram normal sinus rhythm, nonspecific ST-T wave changes, borderline prolonged corrected QT interval.              Thank you for allowing me to participate in the care of your patient.      Sincerely,     ARMEN LU MD     [facility]  cc:   Savanah Lu MD  [address], [address]  [place] [address]  "

## 2024-06-04 LAB
ATRIAL RATE - MUSE: 92 BPM
DIASTOLIC BLOOD PRESSURE - MUSE: NORMAL MMHG
INTERPRETATION ECG - MUSE: NORMAL
P AXIS - MUSE: 66 DEGREES
PR INTERVAL - MUSE: 136 MS
QRS DURATION - MUSE: 94 MS
QT - MUSE: 390 MS
QTC - MUSE: 482 MS
R AXIS - MUSE: 52 DEGREES
SYSTOLIC BLOOD PRESSURE - MUSE: NORMAL MMHG
T AXIS - MUSE: 58 DEGREES
VENTRICULAR RATE- MUSE: 92 BPM

## 2024-06-10 ENCOUNTER — LAB (OUTPATIENT)
Dept: CARDIOLOGY | Facility: CLINIC | Age: 84
End: 2024-06-10
Payer: COMMERCIAL

## 2024-06-10 DIAGNOSIS — I25.83 CORONARY ATHEROSCLEROSIS DUE TO LIPID RICH PLAQUE: ICD-10-CM

## 2024-06-10 LAB
ANION GAP SERPL CALCULATED.3IONS-SCNC: 12 MMOL/L (ref 7–15)
BUN SERPL-MCNC: 23.6 MG/DL (ref 8–23)
CALCIUM SERPL-MCNC: 8.2 MG/DL (ref 8.8–10.2)
CHLORIDE SERPL-SCNC: 99 MMOL/L (ref 98–107)
CREAT SERPL-MCNC: 1.31 MG/DL (ref 0.67–1.17)
DEPRECATED HCO3 PLAS-SCNC: 27 MMOL/L (ref 22–29)
EGFRCR SERPLBLD CKD-EPI 2021: 53 ML/MIN/1.73M2
GLUCOSE SERPL-MCNC: 126 MG/DL (ref 70–99)
HGB BLD-MCNC: 14.4 G/DL (ref 13.3–17.7)
POTASSIUM SERPL-SCNC: 3.1 MMOL/L (ref 3.4–5.3)
SODIUM SERPL-SCNC: 138 MMOL/L (ref 135–145)

## 2024-06-10 PROCEDURE — 80048 BASIC METABOLIC PNL TOTAL CA: CPT

## 2024-06-10 PROCEDURE — 36415 COLL VENOUS BLD VENIPUNCTURE: CPT

## 2024-06-10 PROCEDURE — 85018 HEMOGLOBIN: CPT

## 2024-06-11 DIAGNOSIS — I10 ESSENTIAL HYPERTENSION: Primary | ICD-10-CM

## 2024-06-11 NOTE — PROGRESS NOTES
Savanah Lu MD Caswell, Mallory J, RN  Let him know k is still low and restart k and recheck bmp in a week.  LF                K still on med list. BMP ordered. -[facility]

## 2024-06-18 ENCOUNTER — LAB (OUTPATIENT)
Dept: CARDIOLOGY | Facility: CLINIC | Age: 84
End: 2024-06-18
Payer: COMMERCIAL

## 2024-06-18 DIAGNOSIS — I10 ESSENTIAL HYPERTENSION: ICD-10-CM

## 2024-06-18 LAB
ANION GAP SERPL CALCULATED.3IONS-SCNC: 10 MMOL/L (ref 7–15)
BUN SERPL-MCNC: 16.2 MG/DL (ref 8–23)
CALCIUM SERPL-MCNC: 8 MG/DL (ref 8.8–10.2)
CHLORIDE SERPL-SCNC: 104 MMOL/L (ref 98–107)
CREAT SERPL-MCNC: 1.17 MG/DL (ref 0.67–1.17)
DEPRECATED HCO3 PLAS-SCNC: 26 MMOL/L (ref 22–29)
EGFRCR SERPLBLD CKD-EPI 2021: 61 ML/MIN/1.73M2
GLUCOSE SERPL-MCNC: 79 MG/DL (ref 70–99)
POTASSIUM SERPL-SCNC: 3.4 MMOL/L (ref 3.4–5.3)
SODIUM SERPL-SCNC: 140 MMOL/L (ref 135–145)

## 2024-06-18 PROCEDURE — 80048 BASIC METABOLIC PNL TOTAL CA: CPT

## 2024-06-18 PROCEDURE — 36415 COLL VENOUS BLD VENIPUNCTURE: CPT

## 2024-06-20 ENCOUNTER — HOSPITAL ENCOUNTER (OUTPATIENT)
Dept: CARDIOLOGY | Facility: HOSPITAL | Age: 84
Discharge: HOME | End: 2024-06-20
Attending: INTERNAL MEDICINE | Admitting: INTERNAL MEDICINE
Payer: COMMERCIAL

## 2024-06-20 DIAGNOSIS — I25.83 CORONARY ATHEROSCLEROSIS DUE TO LIPID RICH PLAQUE: ICD-10-CM

## 2024-06-20 LAB — LVEF ECHO: NORMAL

## 2024-06-20 PROCEDURE — 255N000002 HC RX 255 OP 636: Performed by: INTERNAL MEDICINE

## 2024-06-20 PROCEDURE — 93306 TTE W/DOPPLER COMPLETE: CPT | Mod: 26 | Performed by: INTERNAL MEDICINE

## 2024-06-20 RX ADMIN — PERFLUTREN 2 ML: 6.52 INJECTION, SUSPENSION INTRAVENOUS at 15:12

## 2025-02-05 ENCOUNTER — OFFICE VISIT (OUTPATIENT)
Dept: CARDIOLOGY | Facility: CLINIC | Age: 85
End: 2025-02-05
Payer: COMMERCIAL

## 2025-02-05 VITALS
SYSTOLIC BLOOD PRESSURE: 164 MMHG | HEART RATE: 88 BPM | DIASTOLIC BLOOD PRESSURE: 80 MMHG | WEIGHT: 174 LBS | BODY MASS INDEX: 24.61 KG/M2 | RESPIRATION RATE: 14 BRPM

## 2025-02-05 DIAGNOSIS — I10 ESSENTIAL HYPERTENSION: ICD-10-CM

## 2025-02-05 DIAGNOSIS — E78.00 HYPERCHOLESTEREMIA: ICD-10-CM

## 2025-02-05 DIAGNOSIS — Z95.1 S/P CABG X 3: ICD-10-CM

## 2025-02-05 DIAGNOSIS — I25.83 CORONARY ARTERIOSCLEROSIS DUE TO LIPID RICH PLAQUE: Primary | ICD-10-CM

## 2025-02-05 DIAGNOSIS — N40.1 BENIGN PROSTATIC HYPERPLASIA WITH LOWER URINARY TRACT SYMPTOMS, SYMPTOM DETAILS UNSPECIFIED: ICD-10-CM

## 2025-02-05 PROCEDURE — G2211 COMPLEX E/M VISIT ADD ON: HCPCS | Performed by: INTERNAL MEDICINE

## 2025-02-05 PROCEDURE — 99214 OFFICE O/P EST MOD 30 MIN: CPT | Performed by: INTERNAL MEDICINE

## 2025-02-05 RX ORDER — METOPROLOL SUCCINATE 25 MG/1
25 TABLET, EXTENDED RELEASE ORAL DAILY
Qty: 30 TABLET | Refills: 11 | Status: SHIPPED | OUTPATIENT
Start: 2025-02-05

## 2025-02-05 NOTE — PROGRESS NOTES
[facility]  [facility] Clinic Follow-up Note    Assessment & Plan     (I25.10,  I25.83) Coronary atherosclerosis due to lipid rich plaque  (primary encounter diagnosis)  Comment: Angiography in 2005 showed a normal left main, totally occluded proximal left anterior descending, circumflex unremarkable with an obtuse marginal artery one with a 10% stenosis. The right coronary artery had a 10-20% proximal stenosis, a 10% mid stenosis, and a distal 95% stenosis which received a drug-coated stent at that time. Symptomatically doing well, had normal stress test in October 2023, so continue preventive therapy.    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  Stress test as above with good-looking echo as well.     (I10) Hypertension  Comment: Refractory hypertension, was on irbesartan, labetalol, as well as 3 alpha blockers as well as HCTZ. Due to orthostasis his labetalol was discontinued, given continued orthostasis we stopped  blood pressure currently unacceptable,hydrochlorothiazide. will take the liberty of adding Top have him come in for blood pressure check in a week and if does well give him a 90-day supply.       (E78.00) Hypercholesteremia  Comment: Total cholesterol is 112, however this was from 2023, we will plan to fasting lipids in approximately a week and continue probably simvastatin 10 mg a day.      (N40.1) Benign prostatic hyperplasia with lower urinary tract symptoms, symptom details unspecified  Comment: Currently only on finasteride.    Plan  1.  Start Toprol 25 mg a day.  2.  Arrange for fasting lipids in 1 week with blood pressure check.  3.  Will check potassium at that time as well.  Not sure why he is still on potassium despite not being on HCTZ.  4.  If feels well will follow-up with me in 1 year or sooner if needed.  5.  Would  suggest possibly drinking less brandies.    The longitudinal plan of care for the diagnosis(es)/condition(s) as documented were addressed during this visit. Due to the added complexity in care, I will continue to support John in the subsequent management and with ongoing continuity of care.     Subjective  CC: 84-year-old white gentleman here for follow-up visit.  He still living independently in a two-story house with his wife, still tells me he drinks several brandies throughout the day.  Tells me he just had 1 at 12 noon before our 1:00 appointment today.  He is feeling well, seems a little short of memory but no orthostasis, fatigue, chest pains, palpitations, shortness of breath, PND, orthopnea or peripheral edema.    Medications  Current Outpatient Medications   Medication Sig Dispense Refill    bimatoprost (LUMIGAN) 0.01 % Drop [BIMATOPROST (LUMIGAN) 0.01 % DROP] Administer 1 drop to both eyes every evening.      brimonidine-timolol (COMBIGAN) 0.2-0.5 % ophthalmic solution Place 1 drop into the right eye 2 times daily      Calcium Carb-Cholecalciferol (CALCIUM 600+D3 PO) Take 1 tablet by mouth daily      finasteride (PROSCAR) 5 mg tablet [FINASTERIDE (PROSCAR) 5 MG TABLET] Take 5 mg by mouth daily.      irbesartan (AVAPRO) 300 MG tablet Take 1 tablet by mouth daily      metoprolol succinate ER (TOPROL XL) 25 MG 24 hr tablet Take 1 tablet (25 mg) by mouth daily. 30 tablet 11    MULTIVITAMIN (MULTIPLE VITAMIN ORAL) [MULTIVITAMIN (MULTIPLE VITAMIN ORAL)] Take 1 tablet by mouth daily.      omeprazole (PRILOSEC) 20 MG capsule [OMEPRAZOLE (PRILOSEC) 20 MG CAPSULE] Take 1 capsule (20 mg total) by mouth daily. 90 capsule 4    potassium chloride ER (KLOR-CON M) 20 MEQ CR tablet Take 1 tablet by mouth daily      simvastatin (ZOCOR) 20 MG tablet Take 10 mg by mouth at bedtime.         Objective  BP (!) 164/80 (BP Location: Left arm, Patient Position: Sitting, Cuff Size: Adult Regular)   Pulse 88   Resp 14   Wt 78.9  "kg (174 lb)   BMI 24.61 kg/m      General Appearance:    Alert, cooperative, no distress, appears stated age   Head:    Normocephalic, without obvious abnormality, atraumatic   Throat:   Lips, mucosa, and tongue normal; teeth and gums normal   Neck:   Supple, symmetrical, trachea midline, no adenopathy;        thyroid:  No enlargement/tenderness/nodules; no carotid    bruit or JVD   Back:     Symmetric, no curvature, ROM normal, no CVA tenderness   Lungs:     Clear to auscultation bilaterally, respirations unlabored   Chest wall:    No tenderness, midline sternotomy scar   Heart:    Regular rate and rhythm, S1 and S2 normal, no murmur, rub   or gallop   Abdomen:     Soft, non-tender, bowel sounds active all four quadrants,     no masses, no organomegaly   Extremities:   Normal, atraumatic, no cyanosis or edema   Pulses:   2+ and symmetric all extremities   Skin:   Skin color, texture, turgor normal, no rashes or lesions     Results    Lab Results personally reviewed   Lab Results   Component Value Date    CHOL 109 10/18/2016    CHOL 126 05/15/2012     Lab Results   Component Value Date    HDL 32 (L) 10/18/2016    HDL 36 (L) 05/15/2012     No components found for: \"LDLCALC\"  Lab Results   Component Value Date    TRIG 288 (H) 10/18/2016    TRIG 93 05/15/2012     Lab Results   Component Value Date    HGB 14.4 06/10/2024     Lab Results   Component Value Date    BUN 16.2 06/18/2024     06/18/2024    CO2 26 06/18/2024         "

## 2025-02-05 NOTE — PATIENT INSTRUCTIONS
Mr John Gant,  I enjoyed visiting with you again today.  I am glad to hear you are doing well.  Per our conversation I am not happy with the blood pressure and will start the METOPROLOL or TOPROL 25 mg a day.  We will plan on fasting blood test in 1 week and check the lipids at that time with a blood pressure.  I will plan on seeing you 1 year.  Pb uL

## 2025-02-05 NOTE — LETTER
2/5/2025    Zhang Busch, DO  [address]  [place] [address]    RE: John Gant       Dear Colleague,     I had the pleasure of seeing John Gant in the [facility].      [facility]  Heart Care Clinic Follow-up Note    Assessment & Plan     (I25.10,  I25.83) Coronary atherosclerosis due to lipid rich plaque  (primary encounter diagnosis)  Comment: Angiography in 2005 showed a normal left main, totally occluded proximal left anterior descending, circumflex unremarkable with an obtuse marginal artery one with a 10% stenosis. The right coronary artery had a 10-20% proximal stenosis, a 10% mid stenosis, and a distal 95% stenosis which received a drug-coated stent at that time. Symptomatically doing well, had normal stress test in October 2023, so continue preventive therapy.    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  Stress test as above with good-looking echo as well.     (I10) Hypertension  Comment: Refractory hypertension, was on irbesartan, labetalol, as well as 3 alpha blockers as well as HCTZ. Due to orthostasis his labetalol was discontinued, given continued orthostasis we stopped  blood pressure currently unacceptable,hydrochlorothiazide. will take the liberty of adding Top have him come in for blood pressure check in a week and if does well give him a 90-day supply.       (E78.00) Hypercholesteremia  Comment: Total cholesterol is 112, however this was from 2023, we will plan to fasting lipids in approximately a week and continue probably simvastatin 10 mg a day.      (N40.1) Benign prostatic hyperplasia with lower urinary tract symptoms, symptom details unspecified  Comment: Currently only on finasteride.    Plan  1.  Start Toprol 25 mg a day.  2.  Arrange for fasting lipids in 1 week with blood pressure  check.  3.  Will check potassium at that time as well.  Not sure why he is still on potassium despite not being on HCTZ.  4.  If feels well will follow-up with me in 1 year or sooner if needed.  5.  Would suggest possibly drinking less brandies.    The longitudinal plan of care for the diagnosis(es)/condition(s) as documented were addressed during this visit. Due to the added complexity in care, I will continue to support John in the subsequent management and with ongoing continuity of care.     Subjective  CC: 84-year-old white gentleman here for follow-up visit.  He still living independently in a two-story house with his wife, still tells me he drinks several brandies throughout the day.  Tells me he just had 1 at 12 noon before our 1:00 appointment today.  He is feeling well, seems a little short of memory but no orthostasis, fatigue, chest pains, palpitations, shortness of breath, PND, orthopnea or peripheral edema.    Medications  Current Outpatient Medications   Medication Sig Dispense Refill     bimatoprost (LUMIGAN) 0.01 % Drop [BIMATOPROST (LUMIGAN) 0.01 % DROP] Administer 1 drop to both eyes every evening.       brimonidine-timolol (COMBIGAN) 0.2-0.5 % ophthalmic solution Place 1 drop into the right eye 2 times daily       Calcium Carb-Cholecalciferol (CALCIUM 600+D3 PO) Take 1 tablet by mouth daily       finasteride (PROSCAR) 5 mg tablet [FINASTERIDE (PROSCAR) 5 MG TABLET] Take 5 mg by mouth daily.       irbesartan (AVAPRO) 300 MG tablet Take 1 tablet by mouth daily       metoprolol succinate ER (TOPROL XL) 25 MG 24 hr tablet Take 1 tablet (25 mg) by mouth daily. 30 tablet 11     MULTIVITAMIN (MULTIPLE VITAMIN ORAL) [MULTIVITAMIN (MULTIPLE VITAMIN ORAL)] Take 1 tablet by mouth daily.       omeprazole (PRILOSEC) 20 MG capsule [OMEPRAZOLE (PRILOSEC) 20 MG CAPSULE] Take 1 capsule (20 mg total) by mouth daily. 90 capsule 4     potassium chloride ER (KLOR-CON M) 20 MEQ CR tablet Take 1 tablet by mouth daily  "      simvastatin (ZOCOR) 20 MG tablet Take 10 mg by mouth at bedtime.         Objective  BP (!) 164/80 (BP Location: Left arm, Patient Position: Sitting, Cuff Size: Adult Regular)   Pulse 88   Resp 14   Wt 78.9 kg (174 lb)   BMI 24.61 kg/m      General Appearance:    Alert, cooperative, no distress, appears stated age   Head:    Normocephalic, without obvious abnormality, atraumatic   Throat:   Lips, mucosa, and tongue normal; teeth and gums normal   Neck:   Supple, symmetrical, trachea midline, no adenopathy;        thyroid:  No enlargement/tenderness/nodules; no carotid    bruit or JVD   Back:     Symmetric, no curvature, ROM normal, no CVA tenderness   Lungs:     Clear to auscultation bilaterally, respirations unlabored   Chest wall:    No tenderness, midline sternotomy scar   Heart:    Regular rate and rhythm, S1 and S2 normal, no murmur, rub   or gallop   Abdomen:     Soft, non-tender, bowel sounds active all four quadrants,     no masses, no organomegaly   Extremities:   Normal, atraumatic, no cyanosis or edema   Pulses:   2+ and symmetric all extremities   Skin:   Skin color, texture, turgor normal, no rashes or lesions     Results    Lab Results personally reviewed   Lab Results   Component Value Date    CHOL 109 10/18/2016    CHOL 126 05/15/2012     Lab Results   Component Value Date    HDL 32 (L) 10/18/2016    HDL 36 (L) 05/15/2012     No components found for: \"LDLCALC\"  Lab Results   Component Value Date    TRIG 288 (H) 10/18/2016    TRIG 93 05/15/2012     Lab Results   Component Value Date    HGB 14.4 06/10/2024     Lab Results   Component Value Date    BUN 16.2 06/18/2024     06/18/2024    CO2 26 06/18/2024             Thank you for allowing me to participate in the care of your patient.      Sincerely,     ARMEN LU MD     [facility]  cc:   Savanah Lu MD  [address], [address]  [place] [address]      "

## 2025-02-12 ENCOUNTER — LAB (OUTPATIENT)
Dept: CARDIOLOGY | Facility: CLINIC | Age: 85
End: 2025-02-12
Payer: COMMERCIAL

## 2025-02-12 DIAGNOSIS — E78.00 HYPERCHOLESTEREMIA: ICD-10-CM

## 2025-02-12 DIAGNOSIS — I10 ESSENTIAL HYPERTENSION: ICD-10-CM

## 2025-02-12 LAB
ANION GAP SERPL CALCULATED.3IONS-SCNC: 13 MMOL/L (ref 7–15)
BUN SERPL-MCNC: 12.8 MG/DL (ref 8–23)
CALCIUM SERPL-MCNC: 8 MG/DL (ref 8.8–10.4)
CHLORIDE SERPL-SCNC: 100 MMOL/L (ref 98–107)
CHOLEST SERPL-MCNC: 134 MG/DL
CREAT SERPL-MCNC: 1.01 MG/DL (ref 0.67–1.17)
EGFRCR SERPLBLD CKD-EPI 2021: 72 ML/MIN/1.73M2
FASTING STATUS PATIENT QL REPORTED: YES
FASTING STATUS PATIENT QL REPORTED: YES
GLUCOSE SERPL-MCNC: 89 MG/DL (ref 70–99)
HCO3 SERPL-SCNC: 27 MMOL/L (ref 22–29)
HDLC SERPL-MCNC: 46 MG/DL
LDLC SERPL CALC-MCNC: 10 MG/DL
NONHDLC SERPL-MCNC: 88 MG/DL
POTASSIUM SERPL-SCNC: 3.6 MMOL/L (ref 3.4–5.3)
SODIUM SERPL-SCNC: 140 MMOL/L (ref 135–145)
TRIGL SERPL-MCNC: 391 MG/DL

## 2025-02-12 PROCEDURE — 80048 BASIC METABOLIC PNL TOTAL CA: CPT

## 2025-02-12 PROCEDURE — 36415 COLL VENOUS BLD VENIPUNCTURE: CPT

## 2025-02-12 PROCEDURE — 80061 LIPID PANEL: CPT

## 2025-02-13 ENCOUNTER — TELEPHONE (OUTPATIENT)
Dept: CARDIOLOGY | Facility: CLINIC | Age: 85
End: 2025-02-13
Payer: COMMERCIAL

## 2025-02-13 DIAGNOSIS — E78.00 HYPERCHOLESTEREMIA: Primary | ICD-10-CM

## 2025-02-13 NOTE — TELEPHONE ENCOUNTER
Spoke with pt and his wife Kathy regarding Dr. Lu's comments/recommendations. They verbalized understanding and are agreeable to increasing his Metoprolol to BID. They have enough to get through 2 weeks. Advised them to keep a written log of his BP's and to call in two weeks with that update. They verbalized understanding and have no other needs at this time. aa    ----------------------------------------------------------------  Msg received:  From: Savanah Lu MD  Sent: 2/13/2025   3:18 PM CST  To: Rachelle Song RN    Looking at my records he is on finasteride, valsartan 300 mg, as well as metoprolol succinate 25 mg a day.  Would suggest increasing metoprolol succinate to 25 twice a day.  If still hypertensive after that would add amlodipine 2.5 mg daily.  Please reiterate, no smoking, no extra salt, plenty of rest, and confirm he is not snoring at nighttime.  I would like to go slowly, given his age except a blood pressure 150 systolic, hopefully his lightheadedness improves.  LF

## 2025-02-13 NOTE — TELEPHONE ENCOUNTER
"Dr. Lu -- please review update. Comments/recommendations? Thank you. aa  -----------------------------------------------------  Spoke with pt's wife regarding this recent BP reading. She reports that at home his BP has been between 160/80 and the 190/94 that pt has in the office. They do not write down his daily BP's - advised her to please start keeping a log. She shares that pt is still dizzy and that his dizziness was the worst it's been for a couple weeks yesterday morning. She feel a bit at a loss regarding what to do with his BP and his dizziness, balance, and walking issues. She feels like they are unable to find a \"happy medium\". Will send update to [facility] for comments/recommendations. aa  "

## 2025-02-13 NOTE — TELEPHONE ENCOUNTER
From: Savanah Lu MD  Sent: 2/13/2025   8:10 AM CST  To: [facility] Rn Team [facility]    Can someone from the team confirm that he is taking only simvastatin 10 mg, if so I would like him to go to this every other day.  Can we confirm he is not taking anything else such as inclisiran or PCSK9 inhibitor?  Let him know that his triglycerides are extremely high, was he on a good diet just prior to this?  If so once we go to simvastatin every other day in 2 months I would like to recheck this with a good diet, and I may need to add a daily fenofibrate.  Please document this somewhere in chart so I remember my thoughts.  LF

## 2025-04-14 ENCOUNTER — LAB (OUTPATIENT)
Dept: CARDIOLOGY | Facility: CLINIC | Age: 85
End: 2025-04-14
Payer: COMMERCIAL

## 2025-04-14 DIAGNOSIS — E78.00 HYPERCHOLESTEREMIA: ICD-10-CM

## 2025-04-14 DIAGNOSIS — E78.1 HYPERTRIGLYCERIDEMIA: Primary | ICD-10-CM

## 2025-04-14 LAB
CHOLEST SERPL-MCNC: 143 MG/DL
FASTING STATUS PATIENT QL REPORTED: YES
HDLC SERPL-MCNC: 42 MG/DL
LDLC SERPL CALC-MCNC: ABNORMAL MG/DL
LDLC SERPL DIRECT ASSAY-MCNC: 32 MG/DL
NONHDLC SERPL-MCNC: 101 MG/DL
TRIGL SERPL-MCNC: 750 MG/DL

## 2025-04-14 PROCEDURE — 36415 COLL VENOUS BLD VENIPUNCTURE: CPT

## 2025-04-14 PROCEDURE — 80061 LIPID PANEL: CPT

## 2025-04-14 PROCEDURE — 83721 ASSAY OF BLOOD LIPOPROTEIN: CPT | Mod: 59

## 2025-04-16 ENCOUNTER — TELEPHONE (OUTPATIENT)
Dept: CARDIOLOGY | Facility: CLINIC | Age: 85
End: 2025-04-16
Payer: COMMERCIAL

## 2025-04-16 NOTE — TELEPHONE ENCOUNTER
Noted. aa    -------------------------------------------------------------------------------  Msg received:  From: Savanah Lu MD  Sent: 4/16/2025  12:25 PM CDT  To: Rachelle Song RN    I looked at these yesterday, I thought I sent a message, they are overall averaging okay although there were some higher and some lower than I would like.  Given that I am content with continuing my current medical management.  LF

## 2025-04-16 NOTE — TELEPHONE ENCOUNTER
Dr. Lu -- please review BP update provided by pt. Thank you. Aa    Transferred Records with Scan, Non-Provider (04/15/2025)

## 2025-06-16 ENCOUNTER — LAB (OUTPATIENT)
Dept: CARDIOLOGY | Facility: CLINIC | Age: 85
End: 2025-06-16
Payer: COMMERCIAL

## 2025-06-16 ENCOUNTER — RESULTS FOLLOW-UP (OUTPATIENT)
Dept: CARDIOLOGY | Facility: CLINIC | Age: 85
End: 2025-06-16

## 2025-06-16 DIAGNOSIS — I25.83 CORONARY ARTERIOSCLEROSIS DUE TO LIPID RICH PLAQUE: ICD-10-CM

## 2025-06-16 DIAGNOSIS — E78.1 HYPERTRIGLYCERIDEMIA: ICD-10-CM

## 2025-06-16 DIAGNOSIS — Z95.1 S/P CABG X 3: ICD-10-CM

## 2025-06-16 DIAGNOSIS — E78.1 HYPERTRIGLYCERIDEMIA: Primary | ICD-10-CM

## 2025-06-16 LAB
ALBUMIN SERPL BCG-MCNC: 3.6 G/DL (ref 3.5–5.2)
ALP SERPL-CCNC: 72 U/L (ref 40–150)
ALT SERPL W P-5'-P-CCNC: 36 U/L (ref 0–70)
AST SERPL W P-5'-P-CCNC: 55 U/L (ref 0–45)
BILIRUB DIRECT SERPL-MCNC: 0.4 MG/DL (ref 0–0.3)
BILIRUB SERPL-MCNC: 1 MG/DL
CHOLEST SERPL-MCNC: 122 MG/DL
FASTING STATUS PATIENT QL REPORTED: YES
HDLC SERPL-MCNC: 42 MG/DL
LDLC SERPL CALC-MCNC: 5 MG/DL
NONHDLC SERPL-MCNC: 80 MG/DL
PROT SERPL-MCNC: 6.2 G/DL (ref 6.4–8.3)
TRIGL SERPL-MCNC: 374 MG/DL

## 2025-06-16 PROCEDURE — 80061 LIPID PANEL: CPT

## 2025-06-16 PROCEDURE — 36415 COLL VENOUS BLD VENIPUNCTURE: CPT

## 2025-06-16 PROCEDURE — 80076 HEPATIC FUNCTION PANEL: CPT

## 2025-06-16 PROCEDURE — 3048F LDL-C <100 MG/DL: CPT
